# Patient Record
Sex: MALE | HISPANIC OR LATINO | Employment: OTHER | URBAN - METROPOLITAN AREA
[De-identification: names, ages, dates, MRNs, and addresses within clinical notes are randomized per-mention and may not be internally consistent; named-entity substitution may affect disease eponyms.]

---

## 2019-12-23 ENCOUNTER — COMMUNICATION - HEALTHEAST (OUTPATIENT)
Dept: NEUROSURGERY | Facility: CLINIC | Age: 74
End: 2019-12-23

## 2019-12-23 DIAGNOSIS — S22.009A THORACIC SPINE FRACTURE (H): ICD-10-CM

## 2020-01-02 ENCOUNTER — AMBULATORY - HEALTHEAST (OUTPATIENT)
Dept: CARDIOLOGY | Facility: CLINIC | Age: 75
End: 2020-01-02

## 2020-01-02 ENCOUNTER — RECORDS - HEALTHEAST (OUTPATIENT)
Dept: ADMINISTRATIVE | Facility: OTHER | Age: 75
End: 2020-01-02

## 2020-01-06 ENCOUNTER — OFFICE VISIT (OUTPATIENT)
Dept: FAMILY MEDICINE | Facility: CLINIC | Age: 75
End: 2020-01-06
Payer: MEDICARE

## 2020-01-06 VITALS
RESPIRATION RATE: 18 BRPM | BODY MASS INDEX: 28.12 KG/M2 | DIASTOLIC BLOOD PRESSURE: 75 MMHG | TEMPERATURE: 98.2 F | OXYGEN SATURATION: 99 % | HEART RATE: 73 BPM | SYSTOLIC BLOOD PRESSURE: 142 MMHG | WEIGHT: 175 LBS | HEIGHT: 66 IN

## 2020-01-06 DIAGNOSIS — Z78.9 TRANSITION OF CARE PERFORMED WITH SHARING OF CLINICAL SUMMARY: Primary | ICD-10-CM

## 2020-01-06 PROBLEM — I25.10 CORONARY ARTERY DISEASE INVOLVING NATIVE CORONARY ARTERY OF NATIVE HEART WITHOUT ANGINA PECTORIS: Status: ACTIVE | Noted: 2020-01-06

## 2020-01-06 PROBLEM — R55 SYNCOPE AND COLLAPSE: Status: ACTIVE | Noted: 2019-12-21

## 2020-01-06 RX ORDER — METFORMIN HCL 500 MG
2000 TABLET, EXTENDED RELEASE 24 HR ORAL
COMMUNITY

## 2020-01-06 RX ORDER — ALLOPURINOL 300 MG/1
300 TABLET ORAL DAILY
COMMUNITY

## 2020-01-06 RX ORDER — LANOLIN ALCOHOL/MO/W.PET/CERES
1000 CREAM (GRAM) TOPICAL DAILY
COMMUNITY

## 2020-01-06 RX ORDER — ATORVASTATIN CALCIUM 40 MG/1
40 TABLET, FILM COATED ORAL AT BEDTIME
COMMUNITY
Start: 2019-12-24 | End: 2020-01-29

## 2020-01-06 RX ORDER — CLOPIDOGREL BISULFATE 75 MG/1
75 TABLET ORAL DAILY
COMMUNITY

## 2020-01-06 RX ORDER — TRIAMTERENE AND HYDROCHLOROTHIAZIDE 37.5; 25 MG/1; MG/1
1 CAPSULE ORAL EVERY MORNING
COMMUNITY

## 2020-01-06 RX ORDER — TIMOLOL MALEATE 2.5 MG/ML
1 SOLUTION/ DROPS OPHTHALMIC DAILY
COMMUNITY

## 2020-01-06 RX ORDER — LISINOPRIL 5 MG/1
5 TABLET ORAL DAILY
COMMUNITY

## 2020-01-06 RX ORDER — LATANOPROST 50 UG/ML
1 SOLUTION/ DROPS OPHTHALMIC AT BEDTIME
COMMUNITY

## 2020-01-06 RX ORDER — SIMVASTATIN 20 MG
20 TABLET ORAL AT BEDTIME
COMMUNITY
End: 2020-02-04 | Stop reason: ALTCHOICE

## 2020-01-06 RX ORDER — GABAPENTIN 100 MG/1
100 CAPSULE ORAL 3 TIMES DAILY PRN
COMMUNITY
Start: 2015-05-30

## 2020-01-06 RX ORDER — PREGABALIN 300 MG/1
300 CAPSULE ORAL 2 TIMES DAILY
COMMUNITY

## 2020-01-06 RX ORDER — TIMOLOL MALEATE 5 MG/ML
1 SOLUTION/ DROPS OPHTHALMIC DAILY
COMMUNITY

## 2020-01-06 SDOH — HEALTH STABILITY: MENTAL HEALTH: HOW OFTEN DO YOU HAVE A DRINK CONTAINING ALCOHOL?: NEVER

## 2020-01-06 ASSESSMENT — MIFFLIN-ST. JEOR: SCORE: 1471.54

## 2020-01-06 NOTE — Clinical Note
Dr. Mckenzie- Please add your attestation, review and close ASAP. We are almost at the 30 day nyasia. - Shannon

## 2020-01-06 NOTE — PROGRESS NOTES
"       SUBJECTIVE       Scott Gottlieb Sr. is a 74 year old  male with a PMH significant for:     Patient Active Problem List   Diagnosis     Coronary artery disease involving native coronary artery of native heart without angina pectoris     CAD (coronary artery disease)     B12 deficiency     CRF (chronic renal failure)     HTN (hypertension)     Syncope and collapse     Type 2 diabetes mellitus (H)     He presents for follow-up after hospitalization from 12/21/2019 to 12/24/2018 following a syncopal episode in which patient fell and sustained a T3-T4 fracture.  Since hospitalization patient has been doing well though he continues to have some difficulty with sleep.  He attributes this to a sensation which he has difficulty articulating when he lies down.  He describes it in an animated fashion and says \"boom\" holding his hand next to his right occiput.  He denies pain or shooting sensation.  He initially endorsed dizziness but denied it with specific questioning.  He is unable to further localize or qualify the sensation.  He does endorse some pain on the back of his head where he has some edema and an abrasion from his fall. Pt also talks about not liking the food he used to like. He feels \"down\" since his fall because he does not know what caused his fall and whether it will happen again.     Pt has an orthopedic brace for his spinal fracture and is also wearing an event monitor. He has follow up appointments on Monday and Wednesday of next week respectively for these issues.     PMH, Medications and Allergies were reviewed and updated as needed.        REVIEW OF SYSTEMS   12 point review of systems negative except as noted in hpi        OBJECTIVE     Vitals:    01/06/20 1317 01/06/20 1323   BP: (!) 157/74 (!) 142/75   BP Location: Left arm Left arm   Patient Position: Sitting Sitting   Cuff Size: Adult Regular Adult Regular   Pulse: 73    Resp: 18    Temp: 98.2  F (36.8  C)    TempSrc: Oral    SpO2: 99%  " "  Weight: 79.4 kg (175 lb)    Height: 1.676 m (5' 6\")      Body mass index is 28.25 kg/m .    Constitutional: Awake, alert, cooperative, no apparent distress, and appears stated age.  Neck: Supple, symmetrical, trachea midline, no adenopathy, thyroid symmetric, not enlarged and no tenderness, skin normal.  Back: Symmetric, no curvature, spinous processes are non-tender on palpation, No t3-t4 tenderness as before  Lungs: No increased work of breathing, good air exchange, clear to auscultation bilaterally, no crackles or wheezing.  Cardiovascular: Regular rate and rhythm, normal S1 and S2, no S3 or S4, and no murmur noted.  Neurologic: Awake, alert, oriented to name, place and time.  Cranial nerves II-XII are grossly intact.  Motor is 5 out of 5 bilaterally. Sensory intact, and gait is normal.  Neuropsychiatric: Normal affect, mood, orientation, memory and insight.  Skin: No rashes, erythema, pallor, petechia or purpura.    No results found for this or any previous visit (from the past 24 hour(s)).        ASSESSMENT AND PLAN     There are no diagnoses linked to this encounter.  Scott was seen today for hospital f/u.    Diagnoses and all orders for this visit:    Transition of care performed with sharing of clinical summary    Pt has no new complaints today. However it was beneficial to establish care at our clinic. Pt has not had primary care for some time and it sounds like he thought of his ophthalmologist as his primary for a while now.     Pt encouraged to follow up after visits with cardiology and neurosurgery next week. Would like to probe a bit further into mental health/possible depression at that visit despite pt being somewhat resistant to mental health questions today.       RTC in 2 weeks for follow up after seeing specialists or sooner if develops new or worsening symptoms.    Eliezer Branch MD    "

## 2020-01-08 ASSESSMENT — PATIENT HEALTH QUESTIONNAIRE - PHQ9: SUM OF ALL RESPONSES TO PHQ QUESTIONS 1-9: 13

## 2020-01-13 ENCOUNTER — OFFICE VISIT - HEALTHEAST (OUTPATIENT)
Dept: CARDIOLOGY | Facility: CLINIC | Age: 75
End: 2020-01-13

## 2020-01-13 DIAGNOSIS — I25.10 CORONARY ARTERY DISEASE INVOLVING NATIVE CORONARY ARTERY OF NATIVE HEART WITHOUT ANGINA PECTORIS: ICD-10-CM

## 2020-01-13 DIAGNOSIS — R55 SYNCOPE AND COLLAPSE: ICD-10-CM

## 2020-01-13 ASSESSMENT — MIFFLIN-ST. JEOR: SCORE: 1543.84

## 2020-01-15 ENCOUNTER — HOSPITAL ENCOUNTER (OUTPATIENT)
Dept: RADIOLOGY | Facility: CLINIC | Age: 75
Discharge: HOME | End: 2020-01-15
Attending: NEUROLOGICAL SURGERY

## 2020-01-15 ENCOUNTER — OFFICE VISIT - HEALTHEAST (OUTPATIENT)
Dept: NEUROSURGERY | Facility: CLINIC | Age: 75
End: 2020-01-15

## 2020-01-15 DIAGNOSIS — S22.038D OTHER CLOSED FRACTURE OF THIRD THORACIC VERTEBRA WITH ROUTINE HEALING, SUBSEQUENT ENCOUNTER: ICD-10-CM

## 2020-01-15 DIAGNOSIS — S22.009A THORACIC SPINE FRACTURE (H): ICD-10-CM

## 2020-01-15 ASSESSMENT — MIFFLIN-ST. JEOR: SCORE: 1540.6

## 2020-01-29 ENCOUNTER — TELEPHONE (OUTPATIENT)
Dept: FAMILY MEDICINE | Facility: CLINIC | Age: 75
End: 2020-01-29

## 2020-01-29 DIAGNOSIS — I25.10 CORONARY ARTERY DISEASE INVOLVING NATIVE CORONARY ARTERY OF NATIVE HEART WITHOUT ANGINA PECTORIS: Primary | ICD-10-CM

## 2020-01-29 RX ORDER — ATORVASTATIN CALCIUM 40 MG/1
40 TABLET, FILM COATED ORAL AT BEDTIME
Qty: 30 TABLET | Refills: 3 | Status: SHIPPED | OUTPATIENT
Start: 2020-01-29 | End: 2020-05-15

## 2020-01-29 RX ORDER — ATORVASTATIN CALCIUM 40 MG/1
40 TABLET, FILM COATED ORAL AT BEDTIME
Qty: 30 TABLET | Refills: 3 | Status: SHIPPED | OUTPATIENT
Start: 2020-01-29 | End: 2020-01-29

## 2020-01-29 NOTE — TELEPHONE ENCOUNTER
Medication refill request sent to provider. Waiting for approval.  Russ Mitchell, [facility]

## 2020-01-29 NOTE — TELEPHONE ENCOUNTER
[facility] phone call message- patient requesting a refill:    Full Medication Name: atorvastatin    Dose: 40 MG    Pharmacy confirmed as   [facility]/pharmacy #[id number] - [place], [place] - [address] [place] [address]  Phone: [phone] Fax: [phone]  : Yes    Additional Comments: The pt is out and needs a refill      OK to leave a message on voice mail? Yes    Primary language: English      needed? No    Call taken on January 29, 2020 at 11:14 AM by Marcel Barksdale

## 2020-02-04 ENCOUNTER — OFFICE VISIT (OUTPATIENT)
Dept: FAMILY MEDICINE | Facility: CLINIC | Age: 75
End: 2020-02-04
Payer: MEDICARE

## 2020-02-04 VITALS
HEART RATE: 73 BPM | SYSTOLIC BLOOD PRESSURE: 139 MMHG | DIASTOLIC BLOOD PRESSURE: 79 MMHG | TEMPERATURE: 97.4 F | WEIGHT: 175.8 LBS | RESPIRATION RATE: 16 BRPM | BODY MASS INDEX: 28.37 KG/M2

## 2020-02-04 DIAGNOSIS — R55 SYNCOPE AND COLLAPSE: ICD-10-CM

## 2020-02-04 DIAGNOSIS — K21.9 GASTROESOPHAGEAL REFLUX DISEASE, ESOPHAGITIS PRESENCE NOT SPECIFIED: Primary | ICD-10-CM

## 2020-02-04 LAB
ALBUMIN SERPL BCP-MCNC: 4.2 G/DL (ref 3.5–5)
ALP SERPL-CCNC: 101 U/L (ref 45–120)
ALT SERPL W/O P-5'-P-CCNC: 13 U/L (ref 0–45)
ANION GAP SERPL CALCULATED.3IONS-SCNC: 13 MMOL/L (ref 5–18)
AST SERPL-CCNC: 14 U/L (ref 0–40)
BILIRUB SERPL-MCNC: 0.4 MG/DL (ref 0–1)
BUN SERPL-MCNC: 29 MG/DL (ref 8–28)
CALCIUM SERPL-MCNC: 10.4 MG/DL (ref 8.5–10.5)
CHLORIDE SERPL-SCNC: 105 MMOL/L (ref 98–107)
CO2 SERPL-SCNC: 21 MMOL/L (ref 22–31)
CREAT SERPL-MCNC: 1.56 MG/DL (ref 0.7–1.3)
GLUCOSE SERPL-MCNC: 95 MG/DL (ref 70–125)
POTASSIUM SERPL-SCNC: 5 MMOL/L (ref 3.5–5)
PROT SERPL-MCNC: 8.4 G/DL (ref 6–8)
SODIUM SERPL-SCNC: 139 MMOL/L (ref 136–145)

## 2020-02-04 RX ORDER — FAMOTIDINE 10 MG
10 TABLET ORAL 2 TIMES DAILY PRN
Qty: 30 TABLET | Refills: 0 | Status: SHIPPED | OUTPATIENT
Start: 2020-02-04

## 2020-02-04 RX ORDER — CHOLECALCIFEROL (VITAMIN D3) 50 MCG
1 TABLET ORAL DAILY
Qty: 90 TABLET | Refills: 3 | Status: SHIPPED | OUTPATIENT
Start: 2020-02-04 | End: 2020-02-04 | Stop reason: ALTCHOICE

## 2020-02-04 NOTE — PROGRESS NOTES
Preceptor Attestation:   Patient seen, evaluated and discussed with the resident. I have verified the content of the note, which accurately reflects my assessment of the patient and the plan of care.   Supervising Physician:  Blair Campos MD

## 2020-02-04 NOTE — Clinical Note
"Great work on the update.Use a phrase\" I precepted with Dr Blair Campos.\"Thank you,Blair Campos MD"

## 2020-02-04 NOTE — PATIENT INSTRUCTIONS
Patient Education     Understanding Vasovagal Syncope  Vasovagal syncope is fainting caused by a complex nerve and blood vessel reaction in the body. It s the most common cause of fainting. Unlike other causes of fainting, it s not a sign of a problem with the heart or brain.     How to say it  [id number]  [id number]   How vasovagal syncope happens  Many nerves connect with your heart and blood vessels. These nerves help control the speed and force of your heartbeat. They also regulate blood pressure. They control whether your blood vessels should be more open or more closed.  Usually these nerves work together so you always get enough blood to your brain. In certain cases, these nerves may give a wrong signal. This may cause your blood vessels to open wide. At the same time, your heartbeat slows down. Blood can start to pool in your legs, and not enough of it may reach the brain. If that happens, you may briefly lose consciousness. When you lie down or fall down, blood flow to the brain resumes.  What causes vasovagal syncope?  Many triggers can cause vasovagal syncope, such as:    Standing for long periods    Too much heat    Intense emotion, such as fear    Intense pain    The sight of blood or a needle    Exercising for a long time  Older adults may have additional triggers, such as:    Urinating    Swallowing    Coughing    Having a bowel movement  Symptoms of vasovagal syncope  Fainting is the main symptom of vasovagal syncope. You may have symptoms before fainting such as:    Nausea    Warm, flushed feeling    Face that turns pale    Sweaty palms    Feeling dizzy    Blurred vision  If you lie down at the first sign of these symptoms, you will often be able to prevent fainting. Not everyone notices symptoms before fainting, however.  When a person does faint, lying down restores blood flow to the brain. Consciousness should return fairly quickly. You might not feel normal for a little while after you  faint. You might feel depressed or fatigued for a short time. You may even feel nauseous afterwards and vomit.  Some people have only 1 or 2 episodes of vasovagal syncope in their life. For others, it happens more often and with no warning.  Diagnosing vasovagal syncope  Your healthcare provider will ask about your health history and your symptoms. He or she will give you a physical exam. Your blood pressure may be measured while lying down, seated, and standing. You may also have an electrocardiogram (ECG). This is a simple test that looks at the heart s rhythm.  You may be checked for other possible causes of fainting. You may have other tests such as:    Continuous portable ECG monitoring, to look at heart rhythms over time, such as with a holter or event monitor    Echocardiogram, to look at the blood flow in the heart and the heart s motion    Exercise stress testing, to see how your heart does during exercise    Blood tests to check for signs of disease  If these tests are normal, you may have a tilt table test. For this test, you lie down on a platform. Your heart rate and blood pressure are measured while you are lying down. The platform is then tilted upright. Your heart rate and blood pressure are measured again. If you have vasovagal syncope, you may faint during the upward tilt. Sometimes medicines that increase heart rate and the force of heart contractions are used to try and provoke a syncopal episode.  There are many causes of syncope. Some causes are not dangerous. In older persons, unexplained syncope can be a sign of a serious infection or a heart attack. Call 911 or seek immediate medical attention to be evaluated, especially if there has been a fall and injury with the syncope. You should not drive yourself to the hospital or emergency department after a syncopal episode for the safety of yourself or other drivers and passengers. Have someone drive you. Your provider may restrict your driving  until the cause of the syncope is better understood and to make sure the syncope does not become chronic or if it is unpredictable.  Date Last Reviewed: 3/1/2017    3436-2120 [employer]. [address] [place] [address], [place] [address]. All rights reserved. This information is not intended as a substitute for professional medical care. Always follow your healthcare professional's instructions.

## 2020-02-04 NOTE — PROGRESS NOTES
"       SUBJECTIVE       Scott Gottlieb Sr. is a 74 year old  male with a PMH significant for:     Patient Active Problem List   Diagnosis     Coronary artery disease involving native coronary artery of native heart without angina pectoris     CAD (coronary artery disease)     B12 deficiency     CRF (chronic renal failure)     HTN (hypertension)     Syncope and collapse     Type 2 diabetes mellitus (H)     He presents for follow up visit.     Pt was seen in clinic on 1/6/2020 after hospitalization at [facility] from 12/21/2019 to 12/24/2018 following a syncopal episode in which patient fell and sustained a T3-T4 fracture. Pt was discharged from that hospitalization with a back brace for his fracture and a 14 day event monitor.     Pt had follow up with Dr Baljinder Jacinto at [facility] [place] on 1/13/20. His event monitor was reviewed as well as data from an echocardiogram and Stress testing. Pt had no arrhythmias on the event monitor, echo showed 47% EF, and stress testing showed no inducible ischemia. His syncopal episode was thought to be due to vasovagal syncope with possible induction from a strong smell.     On 1/15/20 pt had follow up with neurosurgery team from [facility]. His X-ray was stable and he was instructed to continue to wear his back brace. He will have follow up in March for evaluation of his injury with a CT scan at which point a decision may be made to remove the brace (though back braces are typically left in place for 12 weeks.     Otherwise pt is feeling well today. He has continued to have brief episodes which he cannot articulate well but describes as feeling like he did before his syncopal episode. However he denies associated sensations of dizziness, room spinning, lightheadedness, and visual changes. He just says \"like whoosh\" when describing the sensations. He also has had issues with reflux since discharging from the hospital. He gets heart burn and a bad acidic taste " "in his mouth which resolves after drinking water.    Pt denies depression, but has been frustrated with his limitations in his back brace. He has stopped his gambling hobby even though his family offers to drive him to the casino, however he denies a lack of interest in things he typically likes. His sleep is poor but this has been an issue for 40 years. He does not have any feelings of guilt. His energy levels are normal and he is \"always hungry\". Pt has not been moving more slowly and has no suicidal ideation.     PMH, Medications and Allergies were reviewed and updated as needed.        REVIEW OF SYSTEMS     14 point ROS is negative except as mentioned in the HPI        OBJECTIVE     There were no vitals filed for this visit.  There is no height or weight on file to calculate BMI.    Constitutional: Awake, alert, cooperative, no apparent distress, and appears stated age.  Eyes: Lids and lashes normal, extra ocular muscles intact, sclera clear, conjunctiva normal.  Lungs: No increased work of breathing, good air exchange, clear to auscultation bilaterally  Cardiovascular: Regular rate and rhythm, normal S1 and S2, no S3 or S4, and no murmur noted.  Musculoskeletal: back brace in place   Neurologic: Awake, alert, oriented to name, place and time.  Cranial nerves II-XII are grossly intact. Cerebellar finger to nose,Sensory is intact. and gait is normal.  Neuropsychiatric: Normal affect, mood, orientation  Skin: No rashes, erythema, pallor, petechia or purpura.    No results found for this or any previous visit (from the past 24 hour(s)).        ASSESSMENT AND PLAN     Scott was seen today for recheck. It is difficult to elicit a linear history and it is not exactly clear what symptoms the pt is experiencing, however I met the pt in the setting of syncope with collapse and a T3-T4 fracture. He continues to complain of what sounds like presyncope however without a clear description of what the pt is experiencing it " is hard to say with any certainty.  Diagnoses and all orders for this visit:    Syncope and collapse   Pt had cardiogenic syncope ruled out previously. It is unlikely related to orthostatic hypotension given the lack of association with a change in position. I agree that it is likely vasovagal which may be triggered by noxious stimuli, stress, or medications. I will have the pt follow up in 2 weeks and bring his medications to do a better review of what he is taking.   -     Cardiac causation ruled out with 14 day event monitor  - Orthostatics normal today  -  Likely Vasovagal, educational nae provided   -     Comprehensive Metabolic ([facility]) - Results > 1 hr  - Medication review at next follow up    T3-T4 fracture   Pt has follow up with neurosurgery regarding his back brace and T3-T4 fracture. The fracture was not visible on most recent Xray however protocol is typically to wear for 12 weeks. Pt has his next follow up appointment in March at which point he may or may not come out of his brace after evaluation with a CT. Pt requesting card for handicap parking today. I think this is reasonable and will fill out for 13 months time. Will continue    - continue to wear back brace per ortho   - Paperwork for handicap parking card filled out     Application for handicap parking permit   - Given the back injury sustained by the pt I believe he has mobility limitations   - Application completed and signed    Gastroesophageal reflux disease, esophagitis presence not specified  Pt complaining of clear symptoms of burping and burning chest pain. He says this is new after being treated for hiccups with Reglan on discharge from the hospital. Given the symptoms are new and the pts age I would elect to go with an H2 blocker rather than PPI.  -     famotidine (PEPCID) 10 MG tablet; Take 1 tablet (10 mg) by mouth 2 times daily as needed  -     Comprehensive Metabolic ([facility]) - Results > 1 hr      RTC in 2 weeks for  review of medications (bring medications) or sooner if develops new or worsening symptoms.    Eliezer Branch MD      Preceptor Attestation:    Patient seen, evaluated and discussed with the resident. I have verified the content of the note, which accurately reflects my assessment of the patient and the plan of care.   Supervising Physician:  Blair Campos MD

## 2020-02-04 NOTE — Clinical Note
Preceptor attestation required. Assessment and plan Summary statement required and More detail related to  Medical decision making for each heading. Reordring of diagnosis with most important DX first Syncope or T3 fracture first.I can assist with some organizational tricks at the next clinic. Please revise note and send back to me. This is probably a higher level than L3 would bill L4. Also include the handicapped form completed.Best wishes,Blair Campos MD

## 2020-02-05 NOTE — PROGRESS NOTES
Preceptor Attestation:   Patient seen, evaluated and discussed with the resident. I have verified the content of the note, which accurately reflects my assessment of the patient and the plan of care.   Supervising Physician:  Joey Mckenzie MD.

## 2020-02-17 ENCOUNTER — HOSPITAL ENCOUNTER (OUTPATIENT)
Dept: CT IMAGING | Facility: CLINIC | Age: 75
Discharge: HOME | End: 2020-02-17

## 2020-02-17 DIAGNOSIS — S22.038D OTHER CLOSED FRACTURE OF THIRD THORACIC VERTEBRA WITH ROUTINE HEALING, SUBSEQUENT ENCOUNTER: ICD-10-CM

## 2020-02-20 ENCOUNTER — OFFICE VISIT - HEALTHEAST (OUTPATIENT)
Dept: NEUROSURGERY | Facility: CLINIC | Age: 75
End: 2020-02-20

## 2020-02-20 DIAGNOSIS — S22.039D CLOSED FRACTURE OF THIRD THORACIC VERTEBRA WITH ROUTINE HEALING, UNSPECIFIED FRACTURE MORPHOLOGY, SUBSEQUENT ENCOUNTER: ICD-10-CM

## 2020-03-03 ENCOUNTER — DOCUMENTATION ONLY (OUTPATIENT)
Dept: FAMILY MEDICINE | Facility: CLINIC | Age: 75
End: 2020-03-03
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
## 2020-03-04 NOTE — PROGRESS NOTES
I have reviewed and agree with the behavioral health fellow's summary and recommendations.  Nirmala Anderson, PhD., LP

## 2020-03-05 NOTE — PROGRESS NOTES
This SW reached out to [facility] to discuss scheduling a f/u visit with  or  towards the end of March. Called his primary listed #, no answer. ALONSO did leave a brief message and asked him to call the clinic to schedule an appointment towards the end of March.     Will reach out again in 1 week.     ANAID Paul

## 2020-03-16 ENCOUNTER — HOSPITAL ENCOUNTER (OUTPATIENT)
Dept: RADIOLOGY | Facility: CLINIC | Age: 75
Discharge: HOME | End: 2020-03-16

## 2020-03-16 ENCOUNTER — HOSPITAL ENCOUNTER (OUTPATIENT)
Dept: CT IMAGING | Facility: CLINIC | Age: 75
Discharge: HOME | End: 2020-03-16

## 2020-03-16 DIAGNOSIS — S22.039D CLOSED FRACTURE OF THIRD THORACIC VERTEBRA WITH ROUTINE HEALING, UNSPECIFIED FRACTURE MORPHOLOGY, SUBSEQUENT ENCOUNTER: ICD-10-CM

## 2020-03-19 ENCOUNTER — OFFICE VISIT - HEALTHEAST (OUTPATIENT)
Dept: NEUROSURGERY | Facility: CLINIC | Age: 75
End: 2020-03-19

## 2020-03-19 DIAGNOSIS — S22.038G OTHER CLOSED FRACTURE OF THIRD THORACIC VERTEBRA WITH DELAYED HEALING, SUBSEQUENT ENCOUNTER: ICD-10-CM

## 2020-03-19 DIAGNOSIS — S22.039D CLOSED FRACTURE OF THIRD THORACIC VERTEBRA WITH ROUTINE HEALING, UNSPECIFIED FRACTURE MORPHOLOGY, SUBSEQUENT ENCOUNTER: ICD-10-CM

## 2020-03-27 ENCOUNTER — TELEPHONE (OUTPATIENT)
Dept: FAMILY MEDICINE | Facility: CLINIC | Age: 75
End: 2020-03-27

## 2020-03-27 NOTE — LETTER
April 1, 2020      Scott Gottlieb Sr.  [address] [place] [address]      Dear Scott,      We are sending this letter as a reminder that you are due for a clinic follow up visit. Please call [facility] to schedule your appointment.     [phone]      Sincerely,      [facility] Team

## 2020-03-27 NOTE — TELEPHONE ENCOUNTER
SW reached out to patient, as a follow up from [facility] in early March 2020, to schedule PCP f/u. Unable to connect with patient. Did leave a brief VM and requested patient to call clinic main # and schedule a f/u PCP visit with either  or .     Routing to  to determine if this f/u visit wants in person or telehealth visit.     ALONSO will f/u with Scott in 2-3 days. If unable to connect, will mail a letter at that time.     ANAID Paul

## 2020-03-29 ENCOUNTER — COMMUNICATION - HEALTHEAST (OUTPATIENT)
Dept: NEUROLOGY | Facility: CLINIC | Age: 75
End: 2020-03-29

## 2020-03-31 ENCOUNTER — COMMUNICATION - HEALTHEAST (OUTPATIENT)
Dept: NEUROSURGERY | Facility: CLINIC | Age: 75
End: 2020-03-31

## 2020-04-01 NOTE — TELEPHONE ENCOUNTER
SW attempted 2nd outreach at patient's primary listed phone #. He did not answer. Unable to leave a VM.    SW drafted and mailed letter to patient, on this date, asking him to call the clinic and schedule a f/u visit.     Letter sent to [employer] office for mailing since they are working out of the clinic at this time.     ANAID Paul

## 2020-04-03 ENCOUNTER — VIRTUAL VISIT (OUTPATIENT)
Dept: FAMILY MEDICINE | Facility: CLINIC | Age: 75
End: 2020-04-03
Payer: MEDICARE

## 2020-04-03 DIAGNOSIS — S32.009S CLOSED FRACTURE OF LUMBAR VERTEBRA, UNSPECIFIED FRACTURE MORPHOLOGY, UNSPECIFIED LUMBAR VERTEBRAL LEVEL, SEQUELA: Primary | ICD-10-CM
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
## 2020-04-16 ENCOUNTER — HOSPITAL ENCOUNTER (OUTPATIENT)
Dept: RADIOLOGY | Facility: CLINIC | Age: 75
Discharge: HOME | End: 2020-04-16

## 2020-04-16 ENCOUNTER — HOSPITAL ENCOUNTER (OUTPATIENT)
Dept: CT IMAGING | Facility: CLINIC | Age: 75
Discharge: HOME | End: 2020-04-16

## 2020-04-16 DIAGNOSIS — S22.038G OTHER CLOSED FRACTURE OF THIRD THORACIC VERTEBRA WITH DELAYED HEALING, SUBSEQUENT ENCOUNTER: ICD-10-CM

## 2020-04-17 ENCOUNTER — COMMUNICATION - HEALTHEAST (OUTPATIENT)
Dept: NEUROSURGERY | Facility: CLINIC | Age: 75
End: 2020-04-17

## 2020-04-17 ENCOUNTER — OFFICE VISIT - HEALTHEAST (OUTPATIENT)
Dept: NEUROSURGERY | Facility: CLINIC | Age: 75
End: 2020-04-17

## 2020-04-17 DIAGNOSIS — S22.039D CLOSED FRACTURE OF THIRD THORACIC VERTEBRA WITH ROUTINE HEALING, UNSPECIFIED FRACTURE MORPHOLOGY, SUBSEQUENT ENCOUNTER: ICD-10-CM

## 2020-05-15 DIAGNOSIS — I25.10 CORONARY ARTERY DISEASE INVOLVING NATIVE CORONARY ARTERY OF NATIVE HEART WITHOUT ANGINA PECTORIS: ICD-10-CM

## 2020-05-15 RX ORDER — ATORVASTATIN CALCIUM 40 MG/1
TABLET, FILM COATED ORAL
Qty: 30 TABLET | Refills: 3 | Status: SHIPPED | OUTPATIENT
Start: 2020-05-15 | End: 2020-08-17

## 2020-12-21 DIAGNOSIS — I25.10 CORONARY ARTERY DISEASE INVOLVING NATIVE CORONARY ARTERY OF NATIVE HEART WITHOUT ANGINA PECTORIS: ICD-10-CM

## 2020-12-21 RX ORDER — ATORVASTATIN CALCIUM 40 MG/1
TABLET, FILM COATED ORAL
Qty: 90 TABLET | Refills: 1 | Status: SHIPPED | OUTPATIENT
Start: 2020-12-21 | End: 2021-06-23

## 2020-12-21 NOTE — TELEPHONE ENCOUNTER
Reviewed chart. Is due for updated lipid lab, but will refill due to limitations with coming into clinic due to COVID.

## 2020-12-28 ENCOUNTER — COMMUNICATION - HEALTHEAST (OUTPATIENT)
Dept: ADMINISTRATIVE | Facility: CLINIC | Age: 75
End: 2020-12-28

## 2021-02-04 ENCOUNTER — RECORDS - HEALTHEAST (OUTPATIENT)
Dept: ADMINISTRATIVE | Facility: OTHER | Age: 76
End: 2021-02-04

## 2021-02-11 ENCOUNTER — COMMUNICATION - HEALTHEAST (OUTPATIENT)
Dept: CARDIOLOGY | Facility: CLINIC | Age: 76
End: 2021-02-11

## 2021-02-11 ENCOUNTER — AMBULATORY - HEALTHEAST (OUTPATIENT)
Dept: NURSING | Facility: CLINIC | Age: 76
End: 2021-02-11

## 2021-02-12 ENCOUNTER — OFFICE VISIT - HEALTHEAST (OUTPATIENT)
Dept: CARDIOLOGY | Facility: CLINIC | Age: 76
End: 2021-02-12

## 2021-02-12 DIAGNOSIS — I25.5 ISCHEMIC CARDIOMYOPATHY: ICD-10-CM

## 2021-02-12 DIAGNOSIS — I25.10 CORONARY ARTERY DISEASE INVOLVING NATIVE CORONARY ARTERY OF NATIVE HEART WITHOUT ANGINA PECTORIS: ICD-10-CM

## 2021-02-12 ASSESSMENT — MIFFLIN-ST. JEOR: SCORE: 1540.6

## 2021-02-19 ENCOUNTER — OFFICE VISIT (OUTPATIENT)
Dept: FAMILY MEDICINE | Facility: CLINIC | Age: 76
End: 2021-02-19
Payer: MEDICARE

## 2021-02-19 VITALS
DIASTOLIC BLOOD PRESSURE: 75 MMHG | RESPIRATION RATE: 12 BRPM | WEIGHT: 179.4 LBS | BODY MASS INDEX: 28.96 KG/M2 | HEART RATE: 64 BPM | TEMPERATURE: 97.6 F | SYSTOLIC BLOOD PRESSURE: 147 MMHG | OXYGEN SATURATION: 100 %

## 2021-02-19 DIAGNOSIS — N18.30 STAGE 3 CHRONIC KIDNEY DISEASE, UNSPECIFIED WHETHER STAGE 3A OR 3B CKD (H): ICD-10-CM

## 2021-02-19 DIAGNOSIS — Z00.00 ENCOUNTER FOR MEDICARE ANNUAL WELLNESS EXAM: Primary | ICD-10-CM

## 2021-02-19 LAB
BUN SERPL-MCNC: 34.6 MG/DL (ref 7–21)
CALCIUM SERPL-MCNC: 9.3 MG/DL (ref 8.5–10.1)
CHLORIDE SERPLBLD-SCNC: 103 MMOL/L (ref 98–110)
CHOLEST SERPL-MCNC: 144.7 MG/DL (ref 0–200)
CHOLEST/HDLC SERPL: 4.8 {RATIO} (ref 0–5)
CO2 SERPL-SCNC: 23.6 MMOL/L (ref 20–32)
CREAT SERPL-MCNC: 1.5 MG/DL (ref 0.7–1.3)
GFR SERPL CREATININE-BSD FRML MDRD: 47.2 ML/MIN/1.7 M2
GLUCOSE SERPL-MCNC: 95.3 MG'DL (ref 70–99)
HBA1C MFR BLD: 6.8 % (ref 4.1–5.7)
HDLC SERPL-MCNC: 30.1 MG/DL
LDLC SERPL CALC-MCNC: 66 MG/DL (ref 0–129)
POTASSIUM SERPL-SCNC: 4.5 MMOL/L (ref 3.2–4.6)
SODIUM SERPL-SCNC: 136.2 MMOL/L (ref 132–142)
TRIGL SERPL-MCNC: 242.7 MG/DL (ref 0–150)
VLDL CHOLESTEROL: 48.5 MG/DL (ref 7–32)

## 2021-02-19 PROCEDURE — 36415 COLL VENOUS BLD VENIPUNCTURE: CPT | Performed by: STUDENT IN AN ORGANIZED HEALTH CARE EDUCATION/TRAINING PROGRAM

## 2021-02-19 PROCEDURE — 80061 LIPID PANEL: CPT | Performed by: STUDENT IN AN ORGANIZED HEALTH CARE EDUCATION/TRAINING PROGRAM

## 2021-02-19 PROCEDURE — G0438 PPPS, INITIAL VISIT: HCPCS | Mod: GC | Performed by: STUDENT IN AN ORGANIZED HEALTH CARE EDUCATION/TRAINING PROGRAM

## 2021-02-19 PROCEDURE — 83036 HEMOGLOBIN GLYCOSYLATED A1C: CPT | Performed by: STUDENT IN AN ORGANIZED HEALTH CARE EDUCATION/TRAINING PROGRAM

## 2021-02-19 PROCEDURE — 80048 BASIC METABOLIC PNL TOTAL CA: CPT | Performed by: STUDENT IN AN ORGANIZED HEALTH CARE EDUCATION/TRAINING PROGRAM

## 2021-02-19 NOTE — PATIENT INSTRUCTIONS
Patient Education   Personalized Prevention Plan  You are due for the preventive services outlined below.  Your care team is available to assist you in scheduling these services.  If you have already completed any of these items, please share that information with your care team to update in your medical record.  Health Maintenance Due   Topic Date Due     A1C Lab  1945     Cholesterol Lab  1945     Kidney Microalbumin Urine Test  1945     Diabetic Foot Exam  1945     Discuss Advance Care Planning  1945     Eye Exam  1945     Pneumococcal Vaccine (1 of 4 - PCV13) 03/20/1950     Pneumococcal Vaccine (1 of 4 - PCV13) 03/20/1950     Colorectal Cancer Screening  03/20/1954     Hepatitis C Screening  03/20/1962     Zoster (Shingles) Vaccine (1 of 2) 03/20/1994     FALL RISK ASSESSMENT  03/20/2009     Depression Assessment  07/06/2020     Flu Vaccine (1) 09/01/2020     Basic Metabolic Panel  02/04/2021       MEDICARE PERSONAL PREVENTIVE SERVICES PLAN - IMMUNIZATIONS     Here are your recommended immunizations.  Take this home for your reference.                                                    IMMUNIZATIONS Description Recommend today?     Influenza (Flu shot) Prevents flu; should get every year Yes; Recommended and will wait to get shot done receive get covid-19 shot   PCV 13 Pneumonia vaccination; you get it once No: is not indicated today.   PPSV 23 Second pneumonia vaccination; usually get it 1 year after PCV 13 Recommeded and will wait for provider to determine shot   Zoster (Shingles) Prevents shingles; you get it once  (Check with Part D insurance for coverage, must receive at a pharmacy, not clinic) Recommeded and pt is informed where to get shot done at   Tetanus Prevents tetanus; once every 10 years No: is not indicated today.

## 2021-02-19 NOTE — PROGRESS NOTES
Preceptor Attestation:   Patient seen, evaluated and discussed with the resident. I have verified the content of the note, which accurately reflects my assessment of the patient and the plan of care.   Supervising Physician:  Elenita No MD

## 2021-02-19 NOTE — NURSING NOTE
Medicare Wellness Visit  Health Risk Assessment        Visual Acuity:  Patient have an upcoming vision appointment with his eye doctor in March 2021        FALL RISK ASSESSMENT 2/19/2021   Fallen 2 or more times in the past year? No   Any fall with injury in the past year? No            Health Risk Assessment / Review of Systems     Constitutional: Any fevers or night sweats? No     Eyes:  Vision problems   No     Hearing Do you feel you have hearing loss?   YES     Cardiovascular: Any chest pain, fast or irregular heart beat, calf pain with walking?     No           Respiratory:   Any breathing problems or cough?   No     Gastrointestinal: Any stomach or stool problems?    YES      Genitourinary: Do you have difficulty controlling urination?   No     Muscles and Joints: Any joint stiffness or soreness?    YES     Skin: Any concerning lesions or moles?   No     Nervous System: Any loss of strength or feeling, numbness or tingling, shaking, dizziness, or headache?  No     Mental Health: Any depression, anxiety or problems sleeping?     YES dizziness and headache    Cognition: Do you have any problems with your memory?  No     PHQ-2 Score:   PHQ-2 ( 1999 Pfizer) 2/19/2021 4/3/2020   Q1: Little interest or pleasure in doing things 1 0   Q2: Feeling down, depressed or hopeless 1 1   PHQ-2 Score 2 1       PHQ-9 Score:   [facility] Follow-up to PHQ 1/8/2020   PHQ-9 9. Suicide Ideation past 2 weeks Not at all            Medical Care     What other specialists or organizations are involved in your medical care?    Patient Care Team       Relationship Specialty Notifications Start End    Eliezer Branch MD PCP - General   12/27/19     Phone: [phone] Fax: [phone]         [facility], [facility], [address] [place], [place] [id number]    Eliezer Branch MD Assigned PCP   5/21/20     Phone: [phone] Fax: [phone]         [facility]  "[facility], [facility], [address] [facility] [address]                 Social History / Home Safety     Social History     Tobacco Use     Smoking status: Never Smoker     Smokeless tobacco: Never Used   Substance Use Topics     Alcohol use: Never     Frequency: Never     Marital Status:  Who lives in your household? wife    Does your home have any of the following safety concerns? Loose rugs in the hallway, no grab bars in the bathroom, no handrails on the stairs or have poorly lit areas?  No     Do you feel threatened or controlled by a partner, ex-partner or anyone in your life? No     Has anyone hurt you physically, for example by pushing, hitting, slapping or kicking you   or forcing you to have sex? No          Functional Status     Do you need help with dressing yourself, bathing, or walking?No     Do you need help with the phone, transportation, shopping, preparing meals, housework, laundry, medications or managing money?No       Risk Behaviors and Healthy Habits     History   Smoking Status     Never Smoker   Smokeless Tobacco     Never Used     How many servings of fruits and vegetables do you eat a day?     Exercise:       Do you frequently drive without a seatbelt? No     Do you use any other drugs? No         Do you use alcohol?No      Frailty Assessment            1. By yourself and note using aids, do you have difficulty walking up 10 steps without resting?   YES  (1 for Yes, 0 for No)    2. By yourself and not using mobility aids, do you have any difficulty walking several hundred yards?  YES  (1 for Yes, 0 for No)    3. Have you lost 10 or more pounds unintentionally in the previous year? No  (If \"Yes\" and >5% weight loss, then score 1.  Score 0, if <5% weight loss or \"No\" weight loss)    4. How much of the times during the past 3 weeks did you feel tired?  (\"1\" or \"2\" are scored 1, others 0)    5.  A doctor told the patient " they had the following illnesses:  High blood pressure  Chest pain due to heart issue  Diabetes  Heart attack  Arthritis (0-4 = score 0, 5-11= score 1)        Danisha Boykin CMA

## 2021-02-19 NOTE — PROGRESS NOTES
"  SUBJECTIVE:   Scott Gottlieb Sr. is a 75 year old male who presents for Preventive Visit.    Split Bill scripting  The purpose of this visit is to discuss your medical history and prevent health problems before you are sick. You may be responsible for a co-pay, coinsurance, or deductible if your visit today includes services such as checking on a sore throat, having an x-ray or lab test, or treating and evaluating a new or existing condition :[id number]}  Patient has been advised of split billing requirements and indicates understanding: Yes  Are you in the first 12 months of your Medicare Part B coverage?  No    Physical Health:    In general, how would you rate your overall physical health? good    Outside of work, how many days during the week do you exercise? none    Outside of work, approximately how many minutes a day do you exercise?not applicable    If you drink alcohol do you typically have >3 drinks per day or >7 drinks per week? No    Do you usually eat at least 4 servings of fruit and vegetables a day, include whole grains & fiber and avoid regularly eating high fat or \"junk\" foods? NO    Do you have any problems taking medications regularly?  No    Do you have any side effects from medications? none    Needs assistance for the following daily activities: no assistance needed    Which of the following safety concerns are present in your home?  none identified     Hearing impairment: Yes, Difficulty following a conversation in a noisy restaurant or crowded room.    Difficulty following dialogue in the theater.    Difficulty understanding speech on the telephone.    In the past 6 months, have you been bothered by leaking of urine? no    Mental Health:    In general, how would you rate your overall mental or emotional health? fair  PHQ-2 Score: 2    Do you feel safe in your environment? Yes    Have you ever done Advance Care Planning? (For example, a Health Directive, POLST, or a discussion with a medical " provider or your loved ones about your wishes): No, advance care planning information given to patient to review.  Patient plans to discuss their wishes with loved ones or provider.      Additional concerns to address?  No    Fall risk:  Fallen 2 or more times in the past year?: No  Any fall with injury in the past year?: No  click delete button to remove this line now  Cognitive Screenin) Repeat 3 items (Leader, Season, Table)    2) Clock draw: NORMAL  3) 3 item recall: Recalls 3 objects  Results: 3 items recalled: COGNITIVE IMPAIRMENT LESS LIKELY    Mini-CogTM Copyright S Brody. Licensed by the author for use in [facility]; reprinted with permission (keyonna@[facility]). All rights reserved.      Do you have sleep apnea, excessive snoring or daytime drowsiness?: no    Reviewed and updated as needed this visit by clinical staff  Tobacco  Allergies    Med Hx  Surg Hx  Fam Hx  Soc Hx        Reviewed and updated as needed this visit by Provider                Social History     Tobacco Use     Smoking status: Never Smoker     Smokeless tobacco: Never Used   Substance Use Topics     Alcohol use: Never     Frequency: Never                           Current providers sharing in care for this patient include:   Patient Care Team:  Eliezer Branch MD as PCP - General  Eliezer Branch MD as Assigned PCP    The following health maintenance items are reviewed in Epic and correct as of today:  Health Maintenance   Topic Date Due     A1C  1945     LIPID  1945     MICROALBUMIN  1944     DIABETIC FOOT EXAM  1944     ADVANCE CARE PLANNING  1944     EYE EXAM  1945     Pneumococcal Vaccine: Pediatrics (0 to 5 Years) and At-Risk Patients (6 to 64 Years) (1 of 4 - PCV13) 1950     Pneumococcal Vaccine: 65+ Years (1 of 4 - PCV13) 1950     COLORECTAL CANCER SCREENING  1954     HEPATITIS C SCREENING  1962     ZOSTER IMMUNIZATION (1 of 2) 1994     FALL  "RISK ASSESSMENT  03/20/2009     PHQ-9  07/06/2020     INFLUENZA VACCINE (1) 09/01/2020     BMP  02/04/2021     COVID-19 Vaccine (2 of 2 - Pfizer series) 03/04/2021     MEDICARE ANNUAL WELLNESS VISIT  02/19/2022     DTAP/TDAP/TD IMMUNIZATION (2 - Td) 12/21/2029     IPV IMMUNIZATION  Aged Out     MENINGITIS IMMUNIZATION  Aged Out     Lab work is in process  No vaccines indicated today due to covid vaccine dosed 1 week ago    ROS:  Constitutional, HEENT, cardiovascular, pulmonary, gi and gu systems are negative, except as otherwise noted.    OBJECTIVE:   BP (!) 147/75 (BP Location: Left arm, Patient Position: Sitting, Cuff Size: Adult Regular)   Pulse 64   Temp 97.6  F (36.4  C) (Oral)   Resp 12   Wt 81.4 kg (179 lb 6.4 oz)   SpO2 100%   BMI 28.96 kg/m   Estimated body mass index is 28.96 kg/m  as calculated from the following:    Height as of 1/6/20: 1.676 m (5' 6\").    Weight as of this encounter: 81.4 kg (179 lb 6.4 oz).  EXAM:   GENERAL: healthy, alert and no distress  NECK: no adenopathy, no asymmetry, masses, or scars and thyroid normal to palpation  RESP: lungs clear to auscultation - no rales, rhonchi or wheezes  CV: regular rate and rhythm, normal S1 S2, no S3 or S4, no murmur, click or rub, no peripheral edema and peripheral pulses strong  ABDOMEN: soft, nontender, no hepatosplenomegaly, no masses and bowel sounds normal  MS: no gross musculoskeletal defects noted, no edema    No results found for this or any previous visit (from the past 24 hour(s)).    ASSESSMENT / PLAN:       ICD-10-CM    1. Encounter for Medicare annual wellness exam  Z00.00 Lipid Panel ([facility])     Hemoglobin A1c ([facility])     Basic Metabolic Panel ([facility])     Microalbumin Random Ur ([facility])     Hepatitis C Antibody ([facility])       Patient has been advised of split billing requirements and indicates understanding: Yes    COUNSELING:  Reviewed preventive health counseling, as reflected in patient instructions       " "Regular exercise       Healthy diet/nutrition       Vision screening       Hearing screening    Estimated body mass index is 28.96 kg/m  as calculated from the following:    Height as of 1/6/20: 1.676 m (5' 6\").    Weight as of this encounter: 81.4 kg (179 lb 6.4 oz).    Weight management plan: Discussed healthy diet and exercise guidelines    He reports that he has never smoked. He has never used smokeless tobacco.    Appropriate preventive services were discussed with this patient, including applicable screening as appropriate for cardiovascular disease, diabetes, osteopenia/osteoporosis, and glaucoma.  As appropriate for age/gender, discussed screening for colorectal cancer, prostate cancer, breast cancer, and cervical cancer. Checklist reviewing preventive services available has been given to the patient.    Reviewed patients plan of care and provided an AVS. The Basic Care Plan (routine screening as documented in Health Maintenance) for Scott meets the Care Plan requirement. This Care Plan has been established and reviewed with the Patient.    Mental Health  I do have some concern for depression in this patient however he is resistant to PHQ-9 screening and discussion of therapeutic intervention. I attempted some motivational interviewing without success. Pt identified sleep as a concern and may be more amenable to discussion around depression through the problem of sleep/insomnia.       Pt requested to follow up in 2 weeks for BP check, advance directive discussion, and further discussion around mental health.  Shots delayed as he can get covid vaccine soon and want to avoid other shots within 2 weeks of that.    Precepted with Dr Elenita Branch MD  [facility]  "

## 2021-02-22 LAB — HCV AB SER QL: NEGATIVE

## 2021-02-24 PROBLEM — N18.30 CHRONIC KIDNEY DISEASE, STAGE 3 (H): Status: ACTIVE | Noted: 2021-02-24

## 2021-03-04 ENCOUNTER — AMBULATORY - HEALTHEAST (OUTPATIENT)
Dept: NURSING | Facility: CLINIC | Age: 76
End: 2021-03-04

## 2021-03-12 ENCOUNTER — OFFICE VISIT (OUTPATIENT)
Dept: FAMILY MEDICINE | Facility: CLINIC | Age: 76
End: 2021-03-12
Payer: MEDICARE

## 2021-03-12 VITALS
TEMPERATURE: 98 F | SYSTOLIC BLOOD PRESSURE: 128 MMHG | BODY MASS INDEX: 28.41 KG/M2 | RESPIRATION RATE: 18 BRPM | OXYGEN SATURATION: 99 % | WEIGHT: 176 LBS | DIASTOLIC BLOOD PRESSURE: 67 MMHG | HEART RATE: 76 BPM

## 2021-03-12 DIAGNOSIS — M54.50 CHRONIC LOW BACK PAIN WITHOUT SCIATICA, UNSPECIFIED BACK PAIN LATERALITY: ICD-10-CM

## 2021-03-12 DIAGNOSIS — I10 ESSENTIAL HYPERTENSION: Primary | ICD-10-CM

## 2021-03-12 DIAGNOSIS — G89.29 CHRONIC LOW BACK PAIN WITHOUT SCIATICA, UNSPECIFIED BACK PAIN LATERALITY: ICD-10-CM

## 2021-03-12 PROCEDURE — 99213 OFFICE O/P EST LOW 20 MIN: CPT | Mod: GC | Performed by: STUDENT IN AN ORGANIZED HEALTH CARE EDUCATION/TRAINING PROGRAM

## 2021-03-12 NOTE — PROGRESS NOTES
Preceptor Attestation:    I discussed the patient with the resident and evaluated the patient in person. I have verified the content of the note, which accurately reflects my assessment of the patient and the plan of care.   Supervising Physician:  Kodak Ordaz MD.

## 2021-03-12 NOTE — PROGRESS NOTES
Assessment & Plan     Essential hypertension  Patient here for hypertension follow-up.  He does not take his blood pressure at home however today's visit he is well controlled.  Patient takes lisinopril, and triamterene HCTZ.  We will continue with current regimen.    Chronic low back pain without sciatica, unspecified back pain laterality  Patient's low back pain is a product of an old injury to his back and has been chronic for the past year.  He has no sciatica or progression.  Patient recommended physical therapy but he declined.  He wants to follow-up with the chiropractor.  If this does not work he will try physical therapy.    Sleep difficulty  Patient struggling to fall asleep.  I do have some suspicion for depression however patient is resistant to talk about this.  I would like to try an SSRI to help patient but he is resistant to any new medications.  We will continue discussion with patient at his next visit.     No follow-ups on file.    Eliezer Branch MD  [facility] NEYMAR Chavez is a 75 year old who presents for the following health issues  accompanied by his spouse:    HPI   Pt here for blood pressure follow up. He does not check his blood pressure at home. He has had no episodes of fainting or feeling like he is going to faint nor episodes of vision changes or headache. He takes his medications without issue.    Pt would also like to talk about back pain, left sided, hip sometimes. It is a chronic pain that is residual from a vertebral fracture 1 year ago. He has not tried physical therapy and does not take anything to treat the pain. He is on both lyrica and gabapentin for neuropathy. He has seen a chiropractor previously and asks if he should see a chiropractor again.     Pt also has issues with sleep. He struggles to fall asleep and sometimes sleeps on his chair due to this. He has nights where he wakes up after a short period of time and then cannot  fall back asleep. He denies rumination or worrying at night. He tried melatonin for sleep sometimes and recently purchased a new memory foam mattress for his sleep.       Review of Systems   Constitutional, HEENT, cardiovascular, pulmonary, gi and gu systems are negative, except as otherwise noted.        Objective    /67 (BP Location: Left arm, Patient Position: Sitting, Cuff Size: Adult Regular)   Pulse 76   Temp 98  F (36.7  C) (Oral)   Resp 18   Wt 79.8 kg (176 lb)   SpO2 99%   BMI 28.41 kg/m    Body mass index is 28.41 kg/m .  Physical Exam   GENERAL: healthy, alert and no distress  NECK: no adenopathy, no asymmetry, masses, or scars and thyroid normal to palpation  RESP: lungs clear to auscultation - no rales, rhonchi or wheezes  CV: regular rate and rhythm, normal S1 S2, no S3 or S4, no murmur, click or rub, no peripheral edema and peripheral pulses strong  ABDOMEN: soft, nontender, no hepatosplenomegaly, no masses and bowel sounds normal  MS: no gross musculoskeletal defects noted, no edema

## 2021-05-24 ENCOUNTER — RECORDS - HEALTHEAST (OUTPATIENT)
Dept: ADMINISTRATIVE | Facility: OTHER | Age: 76
End: 2021-05-24

## 2021-05-24 ENCOUNTER — ANCILLARY PROCEDURE (OUTPATIENT)
Dept: GENERAL RADIOLOGY | Facility: CLINIC | Age: 76
End: 2021-05-24
Attending: STUDENT IN AN ORGANIZED HEALTH CARE EDUCATION/TRAINING PROGRAM
Payer: MEDICARE

## 2021-05-24 ENCOUNTER — RECORDS - HEALTHEAST (OUTPATIENT)
Dept: ADMINISTRATIVE | Facility: REHABILITATION | Age: 76
End: 2021-05-24

## 2021-05-24 ENCOUNTER — OFFICE VISIT (OUTPATIENT)
Dept: FAMILY MEDICINE | Facility: CLINIC | Age: 76
End: 2021-05-24
Payer: MEDICARE

## 2021-05-24 VITALS
OXYGEN SATURATION: 100 % | TEMPERATURE: 97.4 F | WEIGHT: 175.8 LBS | SYSTOLIC BLOOD PRESSURE: 129 MMHG | BODY MASS INDEX: 28.37 KG/M2 | RESPIRATION RATE: 20 BRPM | DIASTOLIC BLOOD PRESSURE: 72 MMHG | HEART RATE: 71 BPM

## 2021-05-24 DIAGNOSIS — M79.605 PAIN OF LEFT LOWER EXTREMITY: ICD-10-CM

## 2021-05-24 DIAGNOSIS — I73.9 CLAUDICATION (H): Primary | ICD-10-CM

## 2021-05-24 PROCEDURE — 99214 OFFICE O/P EST MOD 30 MIN: CPT | Mod: GC | Performed by: STUDENT IN AN ORGANIZED HEALTH CARE EDUCATION/TRAINING PROGRAM

## 2021-05-24 PROCEDURE — 73562 X-RAY EXAM OF KNEE 3: CPT | Mod: LT | Performed by: RADIOLOGY

## 2021-05-24 RX ORDER — LIDOCAINE 50 MG/G
OINTMENT TOPICAL PRN
Qty: 50 G | Refills: 0 | Status: SHIPPED | OUTPATIENT
Start: 2021-05-24

## 2021-05-24 ASSESSMENT — PAIN SCALES - GENERAL: PAINLEVEL: WORST PAIN (10)

## 2021-05-24 NOTE — PATIENT INSTRUCTIONS
Dr. Mckenzie    1. Xray today  2. Physical therapy  3. Take tylenol 1 g two times per day.   4. Ultrasound of your arteries.     21   ORDER: US ANKLE-ARM INDEX   [facility]   [phone]  Fax Orders to [phone]     Order faxed to [phone], they will contact patient to schedule.     Ileana Leary    21-   [facility] called to have physical therapy orders faxed to them at fax: [phone].     Ileana Leary

## 2021-05-24 NOTE — PROGRESS NOTES
Assessment & Plan     Pain of left lower extremity  Claudication  The etiology of the pain in patient's lower extremity is not clear.  He does not have DJD, though there is some chronic enthesis noted.  Given his history I do have suspicion for peripheral vascular disease as well.  Patient has had an allergic reaction to NSAIDs in the past and also has CKD making NSAIDs of poor choice even topical for treatment of his pain.  Will trial lidocaine and PT.  Will work-up with MYNOR given suspicion for PVD.  - PHYSICAL THERAPY REFERRAL; Future  - US Ankle-arm index; Future  - XR Knee Left 3 Views  - lidocaine (XYLOCAINE) 5 % external ointment; Apply topically as needed for moderate pain    Follow-up in 3 to 4 weeks  Eliezer Branch MD  [facility] NEYMAR Chavez is a 76 year old who presents for the following health issues  accompanied by his spouse:    HPI   Pt comes in for 1 month of L leg pain.  He describes the leg pain as sharp.  It is made worse with walking however it shows up randomly as well.  He states that the pain happens while sitting still.  He has difficulty localizing the pain in points to his thigh as well as his calf and his knee.  He states that the pain sometimes wakes him from sleep.  He sustained no trauma or injury to the leg.      Patient has a history of coronary artery disease, hypertension, and chronic kidney disease.  He has tried Tylenol for the pain but it has not helped much.      Review of Systems   Constitutional, HEENT, cardiovascular, pulmonary, gi and gu systems are negative, except as otherwise noted.      Objective    /72   Pulse 71   Temp 97.4  F (36.3  C) (Oral)   Resp 20   Wt 79.7 kg (175 lb 12.8 oz)   SpO2 100%   BMI 28.37 kg/m    Body mass index is 28.37 kg/m .  Physical Exam   GENERAL: healthy, alert and no distress  NECK: no adenopathy, no asymmetry, masses, or scars and thyroid normal to palpation  RESP: lungs clear to  auscultation - no rales, rhonchi or wheezes  CV: regular rate and rhythm, normal S1 S2, no S3 or S4, no murmur, click or rub, no peripheral edema and peripheral pulses strong  ABDOMEN: soft, nontender, no hepatosplenomegaly, no masses and bowel sounds normal  MS: no gross musculoskeletal defects noted, no edema.  His left leg is not swollen or edematous, there is no erythema, there is no palpable abnormalities, there is no tenderness to palpation flexion extension abduction at the hip does not reproduce pain, straight leg test negative, flexion extension at the knee does not reproduce pain, Homans test negative.  Bilateral lower extremity pulses are difficult to palpate.  There is no deficit in strength    Results for orders placed or performed in visit on 05/24/21   XR Knee Left 3 Views    Impression    IMPRESSION: The left knee is negative for fracture or significant compartmental narrowing. 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

## 2021-05-26 ENCOUNTER — RECORDS - HEALTHEAST (OUTPATIENT)
Dept: ADMINISTRATIVE | Facility: CLINIC | Age: 76
End: 2021-05-26

## 2021-05-26 ENCOUNTER — TELEPHONE (OUTPATIENT)
Dept: FAMILY MEDICINE | Facility: CLINIC | Age: 76
End: 2021-05-26

## 2021-05-26 NOTE — TELEPHONE ENCOUNTER
[facility] phone call message- general phone call:    Reason for call: Lidocaine is too expensive at their pharmacy they are wondering if there is anything else he can get prescribed.   Action desired: call back.    Return call needed: Yes    OK to leave a message on voice mail? Yes    Advised patient to response may take up to 2 business days: Yes       Primary language: English      needed? No    Call taken on May 26, 2021 at 9:20 AM by Vignesh Cook

## 2021-05-27 ENCOUNTER — RECORDS - HEALTHEAST (OUTPATIENT)
Dept: ADMINISTRATIVE | Facility: OTHER | Age: 76
End: 2021-05-27

## 2021-05-28 ENCOUNTER — RECORDS - HEALTHEAST (OUTPATIENT)
Dept: ADMINISTRATIVE | Facility: CLINIC | Age: 76
End: 2021-05-28

## 2021-05-29 ENCOUNTER — RECORDS - HEALTHEAST (OUTPATIENT)
Dept: ADMINISTRATIVE | Facility: CLINIC | Age: 76
End: 2021-05-29

## 2021-05-30 ENCOUNTER — RECORDS - HEALTHEAST (OUTPATIENT)
Dept: ADMINISTRATIVE | Facility: CLINIC | Age: 76
End: 2021-05-30

## 2021-05-31 ENCOUNTER — RECORDS - HEALTHEAST (OUTPATIENT)
Dept: ADMINISTRATIVE | Facility: CLINIC | Age: 76
End: 2021-05-31

## 2021-06-01 NOTE — PROGRESS NOTES
Preceptor Attestation:    I discussed the patient with the resident and evaluated the patient in person. I have verified the content of the note, which accurately reflects my assessment of the patient and the plan of care.   Supervising Physician:  Joey Mckenzie MD.

## 2021-06-04 ENCOUNTER — RECORDS - HEALTHEAST (OUTPATIENT)
Dept: VASCULAR ULTRASOUND | Facility: CLINIC | Age: 76
End: 2021-06-04

## 2021-06-04 DIAGNOSIS — I73.9 PERIPHERAL VASCULAR DISEASE, UNSPECIFIED (H): ICD-10-CM

## 2021-06-15 ENCOUNTER — OFFICE VISIT (OUTPATIENT)
Dept: FAMILY MEDICINE | Facility: CLINIC | Age: 76
End: 2021-06-15
Payer: MEDICARE

## 2021-06-15 VITALS
TEMPERATURE: 97.6 F | WEIGHT: 176.4 LBS | SYSTOLIC BLOOD PRESSURE: 130 MMHG | BODY MASS INDEX: 28.47 KG/M2 | OXYGEN SATURATION: 100 % | RESPIRATION RATE: 16 BRPM | HEART RATE: 71 BPM | DIASTOLIC BLOOD PRESSURE: 74 MMHG

## 2021-06-15 DIAGNOSIS — G47.00 INSOMNIA, UNSPECIFIED TYPE: ICD-10-CM

## 2021-06-15 DIAGNOSIS — M79.605 PAIN OF LEFT LOWER EXTREMITY: Primary | ICD-10-CM

## 2021-06-15 PROCEDURE — 99213 OFFICE O/P EST LOW 20 MIN: CPT | Mod: GC | Performed by: STUDENT IN AN ORGANIZED HEALTH CARE EDUCATION/TRAINING PROGRAM

## 2021-06-15 RX ORDER — HYDROXYZINE HYDROCHLORIDE 25 MG/1
25-50 TABLET, FILM COATED ORAL
Qty: 30 TABLET | Refills: 0 | Status: SHIPPED | OUTPATIENT
Start: 2021-06-15 | End: 2021-06-28

## 2021-06-15 NOTE — LETTER
[facility] [address] [place] [address]  [phone]      Sherlyn 15, 2021    RE:  Scott Gottlieb Sr.                                                                                                                                                       [address] [place] [address]            To whom it may concern:    Scott Gottlieb Sr. is under my professional care for primary care services. He sustained a fractured vertebrae in December of 2019 which resulted in several months of wearing a back brace and treatment with physical therapy. A Handicap sticker was applied for at that time. He has since done well with his treatment and is fully functional with regards to driving abilities. There is no medical rationale for limitation of this patient's driving privileges. He is from my perspective safe to operate a motor vehicle on public streets and highways .           Sincerely,        Eliezer Branch MD    [facility]

## 2021-06-15 NOTE — PROGRESS NOTES
Assessment & Plan     Pain of left lower extremity  This patient's leg pain has now been going on intermittently over the last month and 1/2 to 2 months.  Getting worse but not getting better either.  Today's description sounded almost more like sciatica however patient is already on gabapentin.  Could consider increasing gabapentin at next visit.  Patient has an aversion to taking medicines and would like to continue with PT and see if that helps.  Patient offered MRI of the spine to rule out radiculopathy but declined at this time.    Insomnia, unspecified type  Patient sleep difficulties are intermittent and often related to other medical conditions including his leg pain and at times persistent hiccuping.  Melatonin seems works pretty well and patient amenable to trying hydroxyzine for additional sleep benefit.  - hydrOXYzine (ATARAX) 25 MG tablet; Take 1-2 tablets (25-50 mg) by mouth nightly as needed for anxiety or other (sleep)    Follow-up after completion of PT or sooner if new or worsening symptoms  Eliezer Branch MD  [facility]    Miryam Chavez is a 76 year old who presents for the following health issues  accompanied by his spouse:    [facility]     Pt is here today for follow up of R leg pain. He has had persistent right leg pain for the past 1.5-2 months which is described as sharp and shooting. He has had MYNOR testing, x-ray, trial of lidocaine, and just started PT therapy.  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  Patient states that his pain is not getting worse but he discontinued.  He previously did not use which shooting to describe it but did today of note.  Patient also has had a history of vertebral fracture and has had continued back pain.  He states that he would like to continue working with PT and see if it helps.  He has an aversion to taking too many pills.    He is having difficulty sleeping due to pain in  his leg and recurring hiccups.  He takes melatonin which seems to work pretty well but some nights like last night he has difficulty sleeping.    Review of Systems   Constitutional, HEENT, cardiovascular, pulmonary, gi and gu systems are negative, except as otherwise noted.      Objective    /74 (BP Location: Right arm, Patient Position: Sitting, Cuff Size: Adult Regular)   Pulse 71   Temp 97.6  F (36.4  C) (Oral)   Resp 16   Wt 80 kg (176 lb 6.4 oz)   SpO2 100%   BMI 28.47 kg/m    Body mass index is 28.47 kg/m .  Physical Exam   GENERAL: healthy, alert and no distress  EYES: Eyes grossly normal to inspection, PERRL and conjunctivae and sclerae normal  NECK: no adenopathy, no asymmetry, masses, or scars and thyroid normal to palpation  MS: no gross musculoskeletal defects noted, no edema  SKIN: no suspicious lesions or rashes  NEURO: Normal strength and tone, mentation intact and speech normal  PSYCH: mentation appears normal, affect normal/bright

## 2021-06-23 DIAGNOSIS — I25.10 CORONARY ARTERY DISEASE INVOLVING NATIVE CORONARY ARTERY OF NATIVE HEART WITHOUT ANGINA PECTORIS: ICD-10-CM

## 2021-06-23 RX ORDER — ATORVASTATIN CALCIUM 40 MG/1
TABLET, FILM COATED ORAL
Qty: 90 TABLET | Refills: 1 | Status: SHIPPED | OUTPATIENT
Start: 2021-06-23 | End: 2022-01-13

## 2021-06-25 DIAGNOSIS — G47.00 INSOMNIA, UNSPECIFIED TYPE: ICD-10-CM

## 2021-06-25 DIAGNOSIS — F41.9 ANXIETY: Primary | ICD-10-CM

## 2021-06-28 RX ORDER — HYDROXYZINE HYDROCHLORIDE 25 MG/1
TABLET, FILM COATED ORAL
Qty: 180 TABLET | Refills: 1 | Status: SHIPPED | OUTPATIENT
Start: 2021-06-28

## 2021-07-05 ENCOUNTER — DOCUMENTATION ONLY (OUTPATIENT)
Dept: FAMILY MEDICINE | Facility: CLINIC | Age: 76
End: 2021-07-05

## 2021-07-05 ENCOUNTER — TRANSFERRED RECORDS (OUTPATIENT)
Dept: HEALTH INFORMATION MANAGEMENT | Facility: CLINIC | Age: 76
End: 2021-07-05

## 2021-07-05 NOTE — PROGRESS NOTES
To be completed in Nursing note:    Please reference list for forms that require a visit for completion.  Please remind patients that providers are given 3-5 business days to complete and return forms.      Form type: [employer]AN OF CARE AND CERT MEDICARE     Date form received: 21    Date form completed by Physician: 21    How was form returned to patient (mailed, faxed, or at  for patient to ): FAXED [phone]    Date form mailed/faxed/left at  for patient and sent to HIM for [email or web address]      Once form is left for patient, faxed, or mailed PCS will then close the documentation only encounter.     Sagar Haas, EMT  1:48 PM  2021

## 2021-07-15 VITALS
RESPIRATION RATE: 16 BRPM | SYSTOLIC BLOOD PRESSURE: 153 MMHG | DIASTOLIC BLOOD PRESSURE: 87 MMHG | OXYGEN SATURATION: 98 % | HEART RATE: 72 BPM

## 2021-07-15 VITALS
BODY MASS INDEX: 24.5 KG/M2 | SYSTOLIC BLOOD PRESSURE: 130 MMHG | DIASTOLIC BLOOD PRESSURE: 65 MMHG | WEIGHT: 175 LBS | WEIGHT: 175.7 LBS | BODY MASS INDEX: 24.6 KG/M2 | RESPIRATION RATE: 16 BRPM | OXYGEN SATURATION: 99 % | DIASTOLIC BLOOD PRESSURE: 62 MMHG | SYSTOLIC BLOOD PRESSURE: 117 MMHG | HEART RATE: 72 BPM | HEART RATE: 60 BPM | HEIGHT: 71 IN | HEIGHT: 71 IN

## 2021-07-15 VITALS
HEIGHT: 71 IN | HEART RATE: 71 BPM | RESPIRATION RATE: 16 BRPM | SYSTOLIC BLOOD PRESSURE: 138 MMHG | WEIGHT: 175 LBS | DIASTOLIC BLOOD PRESSURE: 66 MMHG | OXYGEN SATURATION: 98 % | BODY MASS INDEX: 24.5 KG/M2

## 2021-07-15 NOTE — PATIENT INSTRUCTIONS - HE
1. Continue your brace as you have been.   WEARING THE LUMBAR BRACE:    * Wear the brace when out of bed or sitting upright in bed.  * You should apply the brace before standing.  * You may shower seated without brace.   Sit down on shower chair, remove brace   Shower   Dry   Re-apply brace before standing.   Take care not to fall  *If your brace is not fitting call [facility] [phone]  *Check  your incision and the skin under your brace at least daily.    2. Continue your activity restrictions   *No lifting, pushing or pulling greater than 5-10 pound (this is about a gallon of milk).  *No repetitive bending, twisting, or jarring activities  *No aerobic or strenuous activity  *No activities with increased risk of falls  *You may move about your home as tolerated  *You may walk up and down stairs as tolerated    3. Return to clinic in 4 weeks with CT and XR     4. Call [phone] with the following symptoms:    *Worsening pain not relieved by the pain prescription given  *Worsening or new onset of weakness, or numbness and tingling  *Loss or change in your ability to control bowel or bladder function  *Change in your ability to walk, talk, see or think

## 2021-07-15 NOTE — PROGRESS NOTES
Progress Notes by Baljinder Jacinto MD at 1/13/2020  2:10 PM     Author: Baljinder Jacinto MD Service: -- Author Type: Physician    Filed: 1/13/2020  3:01 PM Encounter Date: 1/13/2020 Status: Signed    : Baljinder Jacinto MD (Physician)         Cardiology Clinic Office Note    Assessment / Plan:    1.  Syncopal episode, likely vasovagal with no evidence of an arrhythmic etiology and no evidence of ischemia.  Possibly triggered by strong smell  2.  Ischemic cardiomyopathy.  Mild with no evidence of ischemia    Plan follow-up in 1 year    ______________________________________________________________________    Subjective:    I had the opportunity to see Scott Gottlieb SrCharley at the [facility]. Scott Gottlieb Sr. is a 74 y.o. male with a history of coronary artery disease.  He had an inferior myocardial infarction in 1991 and subsequently underwent four-vessel bypass revascularization.  This consisted of left internal mammary artery graft left anterior descending, and separate saphenous vein grafts to the diagonal, circumflex obtuse marginal branch, and right posterior descending artery.  I met him when he was hospitalized for a syncopal episode last month.  Follow-up 14-day symptom triggered monitor did not show etiology of his syncopal episode.  Echocardiogram and stress test showed inferior infarct with an ejection fraction of 47% but no evidence of inducible ischemia.    He returns for follow-up today, accompanied by 2 daughters.  He has had no recurrent syncopal episodes.  He did have a couple of episodes of lightheadedness while he was wearing the monitor, no arrhythmias were associated.  He otherwise feels well.  He is frustrated at the limitations on his activity, they are predominantly because of his back in the spinal brace.  He does note sensitivity to odors which are longstanding, but does not recall any unusual odors the day he lost consciousness after eating  at a restaurant.    He sleeps well and snores lightly and has intermittent daytime sleepiness but this is not a pervasive issue.  He does not drink alcoholic beverages.  He strongly wishes to get back to visiting the casino    ______________________________________________________________________    Problem List:  Patient Active Problem List   Diagnosis   ? Syncope and collapse   ? Coronary artery disease involving native coronary artery of native heart without angina pectoris     Medical History:  Past Medical History:   Diagnosis Date   ? Coronary artery disease    ? Hyperlipidemia      Surgical History:  History reviewed. No pertinent surgical history.  Social History:  Social History     Socioeconomic History   ? Marital status:      Spouse name: Not on file   ? Number of children: Not on file   ? Years of education: Not on file   ? Highest education level: Not on file   Occupational History   ? Not on file   Social Needs   ? Financial resource strain: Not on file   ? Food insecurity:     Worry: Not on file     Inability: Not on file   ? Transportation needs:     Medical: Not on file     Non-medical: Not on file   Tobacco Use   ? Smoking status: Former Smoker     Types: Cigarettes   ? Smokeless tobacco: Never Used   ? Tobacco comment: Quit long time ago   Substance and Sexual Activity   ? Alcohol use: Never     Frequency: Never     Drinks per session: Patient refused     Binge frequency: Patient refused   ? Drug use: Never   ? Sexual activity: Not on file   Lifestyle   ? Physical activity:     Days per week: Not on file     Minutes per session: Not on file   ? Stress: Not on file   Relationships   ? Social connections:     Talks on phone: Not on file     Gets together: Not on file     Attends Yazidi service: Not on file     Active member of club or organization: Not on file     Attends meetings of clubs or organizations: Not on file     Relationship status: Not on file   ? Intimate partner violence:      Fear of current or ex partner: Not on file     Emotionally abused: Not on file     Physically abused: Not on file     Forced sexual activity: Not on file   Other Topics Concern   ? Not on file   Social History Narrative   ? Not on file     Sleep History:  Mostly restorative  Exercise History:  Walking.  Limited by back brace    Review of Systems:   General: WNL  Eyes: WNL  Ears/Nose/Throat: WNL  Lungs: Shortness of Breath  Heart: Arm Pain, Shortness of Breath with activity  Stomach: Constipation, Diarrhea, Heartburn, Nausea  Bladder: WNL  Muscle/Joints: Joint Pain  Skin: WNL  Nervous System: Daytime Sleepiness, Dizziness, Loss of Balance  Mental Health: Depression, Anxiety     Blood: WNL          Family History:  History reviewed. No pertinent family history.      Allergies:  Allergies   Allergen Reactions   ? Aspirin    ? Compazine [Prochlorperazine Edisylate]    ? Sulfa (Sulfonamide Antibiotics)      Medications:  Current Outpatient Medications   Medication Sig Dispense Refill   ? atorvastatin (LIPITOR) 40 MG tablet Take 1 tablet (40 mg total) by mouth at bedtime. 30 tablet 0   ? clopidogrel (PLAVIX) 75 mg tablet Take 75 mg by mouth daily.     ? cyanocobalamin 1000 MCG tablet Take 1,000 mcg by mouth daily.     ? ergocalciferol (ERGOCALCIFEROL) 1,250 mcg (50,000 unit) capsule Take 50,000 Units by mouth 2 (two) times a week.      ? latanoprost (XALATAN) 0.005 % ophthalmic solution Administer 1 drop to both eyes at bedtime.     ? lisinopril (PRINIVIL,ZESTRIL) 5 MG tablet Take 5 mg by mouth daily.     ? metFORMIN (GLUCOPHAGE-XR) 500 MG 24 hr tablet Take 2,000 mg by mouth daily with breakfast.     ? metoclopramide (REGLAN) 10 MG tablet Take 1 tablet (10 mg total) by mouth 3 (three) times a day as needed (hiccups). 10 tablet 0   ? pregabalin (LYRICA) 300 MG capsule Take 300 mg by mouth 2 (two) times a day.     ? timolol maleate (TIMOPTIC) 0.5 % ophthalmic solution Administer 1 drop to both eyes daily.     ?  "triamterene-hydrochlorothiazide (DYAZIDE) 37.5-25 mg per capsule Take 1 capsule by mouth every morning.     ? oxyCODONE (ROXICODONE) 5 MG immediate release tablet Take 1 tablet (5 mg total) by mouth every 6 (six) hours as needed for pain. 6 tablet 0     No current facility-administered medications for this visit.        Objective:   Wt Readings from Last 3 Encounters:   01/13/20 175 lb 11.2 oz (79.7 kg)   12/24/19 168 lb (76.2 kg)     Vital signs:  /62 (Patient Site: Left Arm, Patient Position: Sitting, Cuff Size: Adult Regular)   Pulse 60   Resp 16   Ht 5' 10.98\" (1.803 m)   Wt 175 lb 11.2 oz (79.7 kg)   BMI 24.52 kg/m        Physical Exam:    General Appearance : Awake, Alert, No acute distress  HEENT: No Scleral icterus; the mucous membranes were pink and moist.  Conjunctivae not injected  Neck:  No cervical bruits, jugular venous distention, or thyromegaly   Chest: The spine was straight. Chest wall symmetric  Lungs: Respirations unlabored; the lungs are clear to auscultation.  No wheezing   Cardiovascular:   Normal point of maximal impulse.  Auscultation reveals normal first and second heart sounds with no murmurs, rubs, or gallops.  Carotid, radial, and dorsalis pedal pulses are intact and symmetric.    Abdomen: No organomegaly, masses, bruits, or tenderness. Bowels sounds are present  Extremities:  No clubbing, cyanosis.  No edema  Skin: No rash, bruising  Musculoskeletal: No tenderness.  Neurologic: Alert and oriented ×3. Speech is fluent.    Lab Results:  LIPIDS:  Lab Results   Component Value Date    CHOL 188 12/22/2019     Lab Results   Component Value Date    HDL 29 (L) 12/22/2019     Lab Results   Component Value Date    LDLCALC  12/22/2019      Comment:      Invalid, Triglycerides >400     Lab Results   Component Value Date    TRIG 532 (H) 12/22/2019     Echocardiogram 12/2019:    Left ventricle ejection fraction is mildly decreased. The calculated left ventricular ejection fraction is 47%. " There is akinesis of the basal inferior and inferolateral walls.    Normal right ventricular size and systolic function.    No significant valvular heart disease.    No previous study for comparison.      14-day symptom triggered monitor 12/2019:  Symptoms of dizziness associated with normal sinus rhythm, rate 79  beats per minute on 01/04.  Some evidence for conduction system disease with  first-degree AV block and IVCD, possibly right bundle branch block noted.  Auto  transmission showed Mobitz type I second-degree AV block on 12/30 associated with  heart rate 66.  There were occasional other blocked atrial premature beats.   Bradycardia at a heart rate of 39 beats per minute noted on 12/27.  No atrial  fibrillation present.    Pharmacologic nuclear stress test 12/2019:  1.The nuclear stress test is abnormal.  ?  2..Negative pharmacological regadenoson ECG for ischemia.  ?  3.There is a large area of infarction in the mid to basal inferior and inferolateral segment(s) of the left ventricle.  No significant ischemia seen.  ?  4.There is no prior study for comparison.      CAPRI PLAZA MD [facility]  [phone]    This note created using Dragon voice recognition software.  Sound alike errors may have escaped editing.

## 2021-07-15 NOTE — PATIENT INSTRUCTIONS - HE
Wean from brace. Remove the brace for 1-2 hours a day, increasing each day by 1-2 hour increments.  If at any time during the wean you experience excessive fatigue, back pain or spasm, back down to the previous level for a day or so, then resume schedule.  Once out of brace for a full 8 hours, discontinue altogether or wear only as needed for comfort.     Okay to drive when you are out of your brace. Increase lifting restriction 5-10lbs a week as tolerated. No further follow up needed. Call with questions.

## 2021-07-15 NOTE — TELEPHONE ENCOUNTER
During the current COVID19 pandemic efforts are being made to decreased patient exposure by limiting their interactions with healthcare facilities, particularly in our higher risk (age > 60) patients. Efforts are being made to delay imaging where possible, and to engage in virtual/telephone visits whenever possible. Given these current constraints, a chart review has been completed and the follow up recs are as follows:    Ok to push CT and Xray out another 2 weeks. Can have telephone visit. Continue brace.       Plans for follow up will be altered accordingly in the case of patient request, change in neurological exam, worsening pain, worsening imaging.     Leonela XIE-ACNP  [facility]  [address], [address]  [place] [address]    O: [phone]  P: [phone]

## 2021-07-15 NOTE — LETTER
Letter by Joi Pisano CNP at      Author: Joi Pisano CNP Service: -- Author Type: --    Filed:  Encounter Date: 4/17/2020 Status: (Other)         April 17, 2020     Patient: Sctot Gottlieb Sr.   YOB: 1945   Date of Visit: 4/17/2020       To Whom It May Concern:    It is my medical opinion that Scott Gottlieb  is able to drive when he is out of his brace.    If you have any questions or concerns, please don't hesitate to call.    Sincerely,        Electronically signed by Joi Pisano CNP

## 2021-07-15 NOTE — PROGRESS NOTES
"Scott Gottlieb Sr. is a 75 y.o. male who is being evaluated via a billable telephone visit.      The patient has been notified of following:     \"This telephone visit will be conducted via a call between you and your physician/provider. We have found that certain health care needs can be provided without the need for a physical exam.  This service lets us provide the care you need with a short phone conversation.  If a prescription is necessary we can send it directly to your pharmacy.  If lab work is needed we can place an order for that and you can then stop by our lab to have the test done at a later time.    Telephone visits are billed at different rates depending on your insurance coverage. During this emergency period, for some insurers they may be billed the same as an in-person visit.  Please reach out to your insurance provider with any questions.    If during the course of the call the physician/provider feels a telephone visit is not appropriate, you will not be charged for this service.\"    Patient has given verbal consent to a Telephone visit? Yes    Patient would like to receive their AVS by AVS Preference: Mail a copy.    Additional provider notes:      ASSESSMENT :  CT with evidence of healing. Reviewed with Dr. Fishman - wean from brace.     PLAN:.  1. Wean from brace  2. Increase lifting restriction 5-10lbs a week  3. Increase activity as tolerated. No follow up needed.     TODAY, Scott Gottlieb Sr. reports no back pain, doing well.      PAST MEDICAL HISTORY, SURGICAL HISTORY, REVIEW OF SYMPTOMS, MEDICATIONS AND ALLERGIES:  Past medical history, surgical history, ROS, medications and allergies reviewed with patient and remain unchanged from previous visit.    Past Medical History:   Diagnosis Date     Coronary artery disease      Diabetes mellitus (H)      Hyperlipidemia      Exam as reported by patient- no back pain, leg pain or weakness, no new neuro deficits.      RADIOGRAPHIC IMAGING: " Thoracic CT personally visualized.   Interval signs of healing at T3-4 fracture. Discussed and reviewed with Dr. Fishman.      Joi Pisano CNP  [facility]surgery  O: [phone]  P: [phone]    Phone call duration: 10 minutes    Joi Pisano, CNP

## 2021-07-15 NOTE — PATIENT INSTRUCTIONS - HE
1. No changes in your medications today  2. Make a schedule for your self to exercise 20 to 30 minutes each day.  Try a variety of different exercises to help keep your interest up.  This will also help to prevent you from getting more and more stiff.

## 2021-07-15 NOTE — PROGRESS NOTES
CHART NOTE     DATE OF SERVICE:  2020     : 1945   74 y.o.     Attending neurosurgeon : Dr. Garcia     ASSESSMENT :   No back pain, has been wearing his brace.  CT with some evidence of healing, fracture plane still present. Initial injury 2019.     PLAN:.  1. Follow up CT in 4-6 weeks to evaluate T3/4 osteophyte fracture for healing, upright imaging to eval alignment - hopefully fracture will be healed and we can wean from brace.   2. Continue brace and restrictions  3. Should not drive with brace in place  4. Hopefully wean from brace at next visit.   HPI:  Scott Gottlieb Sr. is status post hospital consult for T3-4 fracture through the anterior bridging osteophyte in the setting of DISH on 2019.    Patient initially presented with syncopal episode and fall.      TODAY, Scott Gottlieb Sr. reports no back pain, he is hoping to come out of his brace today.      PAST MEDICAL HISTORY, SURGICAL HISTORY, REVIEW OF SYMPTOMS, MEDICATIONS AND ALLERGIES:  Past medical history, surgical history, ROS, medications and allergies reviewed with patient and remain unchanged from previous visit.    Past Medical History:   Diagnosis Date     Coronary artery disease      Diabetes mellitus (H)      Hyperlipidemia        PHYSICAL EXAM:    /87   Pulse 72   Resp 16   SpO2 98%     Neurological exam reveals:  Respirations easy, non-labored.   Skin: W/D/I. No rashes, lesions or breaks in integrity.   Recent and remote memory intact, fund of knowledge wnl.    Alert and oriented x3, speech fluent and appropriate.   PERRL, EOMI, No nystagmus,   Arm strength bilateral grasp, biceps, triceps, and deltoids 5/5   Leg strength bilateral dorsiflexion, plantar flexion, and hip flexion 5/5  No extremity edema noted.   Can heel/toe walk, do toe rises and squats without difficulty.   Muscle Bulk and tone wnl.   Gait and station:Normal     RADIOGRAPHIC IMAGING: Thoracic CT personally visualized.   EXAM: CT THORACIC SPINE  WO CONTRAST  LOCATION: [facility]  DATE/TIME: 2/17/2020 11:34 AM     INDICATION: Follow-up T3 fracture, DISH.  COMPARISON: None.  TECHNIQUE: Routine without IV contrast. Multiplanar reformats. Dose reduction techniques were used.     FINDINGS: Again seen are extensive bridging anterior osteophytosis consistent with DISH.     There is a fracture of the anterior bridging osteophyte at T3-T4 which was also noted on the previous examination. New from the prior examination is sclerosis extending in a linear fashion from the fracture into the right aspect of T4. Findings are   consistent with bony remodeling at a fracture, but there is no evidence for loss of height or change in alignment. No new fractures are identified. MRI would be helpful for evaluation of the presence or absence of bone edema if indicated.     IMPRESSION:   CONCLUSION:  1. Stable changes of DISH with flowing/bridging osteophytosis along the anterior margin of the thoracolumbar spine.  2. Again seen is a fracture of the bridging osteophyte at T3-T4. This was also noted on the prior examination.  3. New from the prior study is evidence of some reactive sclerosis and bony remodeling and changes of healing extending into the right aspect of T4, but there is no evidence for loss of vertebral body height or change in alignment. MRI could be performed   to assess for the presence or absence of associated bone edema.  4 Extensive vascular calcifications.     Joi Pisano CNP  [facility]surgery  O: [phone]  P: [phone]

## 2021-07-15 NOTE — TELEPHONE ENCOUNTER
PATIENT NAME:  Scott Gottlieb Sr.  YOB: 1945  MRN: [id number]  SURGEON: Dr. Garcia   DATE of CONSULT:  12-22-19  DIAGNOSIS:  ankylosing spondylitis w syncopal event resulting in nondisplaced fracture of the anterior bridging osteophyte at T3-T4 which extends into the superior endplate and right lateral cortex of T4. He is intact. Plan to treat in brace.     FOLLOW-UP:  Post HOSPITAL CONSULT F/U Visit: 2 weeks - 3 WEEKS  Post-op Provider: NP/[facility]  DIAGNOSTICS:  radiology: X-Ray: THORACIC, AP/LAT UPRIGHT IN BRACE  DISPOSITION:  HOME WITH WIFE AFTER CARDIAC WORK UP.    ADDITIONAL INSTRUCTIONS FOR MEDICAL STAFF:    Must wear brace whenever seated upright or OOB.   minimum assist (75% patients effort)

## 2021-07-15 NOTE — PROGRESS NOTES
"CHART NOTE     DATE OF SERVICE:  1/15/2020     : 1945   74 y.o.     Attending neurosurgeon : Dr. Garcia     ASSESSMENT :   No significant back pain, has been wearing his brace.  X-rays stable in alignment but fracture line not wildly visible.    PLAN:.  1. Follow up CT in 4-6 weeks to evaluate T3 fracture for healing, fracture not well visualized on plain films  2. Continue brace and restrictions  3. Follow up in trauma clinic  4. Should not drive with brace in place  5. Did discuss that we typically brace for 12 weeks and that he may not be able to come out of the brace at next visit.   HPI:  Scott Gottlieb Sr. is status post hospital consult for T3-4 fracture through the anterior bridging osteophyte in the setting of DISH on 2019.    Patient initially presented with syncopal episode and fall.      TODAY, Scott Gottlieb Sr. reports no back pain, annoyance with the brace     PAST MEDICAL HISTORY, SURGICAL HISTORY, REVIEW OF SYMPTOMS, MEDICATIONS AND ALLERGIES:  Past medical history, surgical history, ROS, medications and allergies reviewed with patient and remain unchanged from previous visit.    Past Medical History:   Diagnosis Date     Coronary artery disease      Diabetes mellitus (H)      Hyperlipidemia        PHYSICAL EXAM:    /65   Pulse 72   Ht 5' 10.98\" (1.803 m)   Wt 175 lb (79.4 kg)   SpO2 99%   BMI 24.42 kg/m      Neurological exam reveals:  Respirations easy, non-labored.   Skin: W/D/I. No rashes, lesions or breaks in integrity.   Recent and remote memory intact, fund of knowledge wnl.    Alert and oriented x3, speech fluent and appropriate.   PERRL, EOMI, No nystagmus,   Arm strength bilateral grasp, biceps, triceps, and deltoids 5/5   Leg strength bilateral dorsiflexion, plantar flexion, and hip flexion 5/5  No extremity edema noted.   Can heel/toe walk, do toe rises and squats without difficulty.   Muscle Bulk and tone wnl.   Reflexes: No pathological reflexes   Gait and " station:Normal     RADIOGRAPHIC IMAGING: Plain x-rays Films personally reviewed and interpreted. .   Reviewed imaging with patient and family.      EXAM: XR THORACIC SPINE 2 VWS  LOCATION: [facility]  DATE/TIME: 1/15/2020 9:32 AM     INDICATION: 2-3 weeks post T3-T4 fracture 12-22-19  COMPARISON: 12/22/2019 and 12/21/2019     IMPRESSION:   Redemonstration of diffuse idiopathic skeletal hyperostosis throughout the thoracic spine. The known nondisplaced fracture through the anterior bridging osteophyte at T3-T4 extending into the T4 superior endplate is better demonstrated on   recent prior CT. No interval height loss at the T3 or T4 vertebral body. Overall vertebral body heights and lateral alignment are maintained. Mild broad dextrocurvature of the thoracic spine. Mild to moderate degenerative spondylosis is unchanged. Prior   median sternotomy and changes of CABG again noted. The inferior most sternotomy wire is again noted to be fractured. Visualized ribs and soft tissues are unremarkable. Evaluation of the lungs is limited secondary to technique.      Joi Pisano CNP  [facility]  O: [phone]  P: [phone]

## 2021-07-15 NOTE — PROGRESS NOTES
Progress Notes by Baljinder Jacinto MD at 2/12/2021  1:10 PM     Author: Baljinder Jacinto MD Service: -- Author Type: Physician    Filed: 2/12/2021  1:53 PM Encounter Date: 2/12/2021 Status: Signed    : Baljinder Jacinto MD (Physician)         Cardiology Clinic Office Note    Assessment / Plan:    1.  Ischemic cardiomyopathy.  Mild with no evidence of ischemia  2.  Sedentary lifestyle.  Reviewed the benefits of a regular moderate exercise program.  He will discuss with his primary care provider whether depression could be contributing to his inactivity.    3.  Syncopal episode, likely vasovagal with no evidence of an arrhythmic etiology and no evidence of ischemia.  Possibly triggered by strong smell.  No recurrence.      Plan follow-up in 1 year    ______________________________________________________________________    Subjective:    I had the opportunity to see Scott Gottlieb SrCharley at the [facility]. Scott Gottlieb Sr. is a 75 y.o. male with a history of coronary artery disease.  He had an inferior myocardial infarction in 1991 and subsequently underwent four-vessel bypass revascularization.  This consisted of left internal mammary artery graft left anterior descending, and separate saphenous vein grafts to the diagonal, circumflex obtuse marginal branch, and right posterior descending artery.  He developed syncope about a year ago, suspected to be triggered by a strong smell.  Follow-up 14-day symptom triggered monitor did not show etiology of his syncopal episode.  Echocardiogram and stress test showed inferior infarct with an ejection fraction of 47% but no evidence of inducible ischemia.  He returns today for routine follow-up, accompanied by daughter    Over the past year, he has not experienced any chest pain.  He is somewhat fearful of falling and is not exercising as he once was.  He does admit to being depressed by the restrictions and his isolation.  His  weight is stable.  He has not had any further syncopal episodes.  He has a stationary bicycle as well as a treadmill, but is not motivated to use them.  Weight is stable      ______________________________________________________________________    Problem List:  Patient Active Problem List   Diagnosis   ? Syncope and collapse   ? Coronary artery disease involving native coronary artery of native heart without angina pectoris     Medical History:  Past Medical History:   Diagnosis Date   ? Coronary artery disease    ? Diabetes mellitus (H)    ? Hyperlipidemia      Surgical History:  Past Surgical History:   Procedure Laterality Date   ? CARDIAC SURGERY       Social History:  Social History     Socioeconomic History   ? Marital status:      Spouse name: Not on file   ? Number of children: Not on file   ? Years of education: Not on file   ? Highest education level: Not on file   Occupational History   ? Not on file   Social Needs   ? Financial resource strain: Not on file   ? Food insecurity     Worry: Not on file     Inability: Not on file   ? Transportation needs     Medical: Not on file     Non-medical: Not on file   Tobacco Use   ? Smoking status: Former Smoker     Types: Cigarettes   ? Smokeless tobacco: Never Used   ? Tobacco comment: Quit long time ago   Substance and Sexual Activity   ? Alcohol use: Never     Frequency: Never     Drinks per session: Patient refused     Binge frequency: Patient refused   ? Drug use: Never   ? Sexual activity: Not on file   Lifestyle   ? Physical activity     Days per week: Not on file     Minutes per session: Not on file   ? Stress: Not on file   Relationships   ? Social connections     Talks on phone: Not on file     Gets together: Not on file     Attends [employer] service: Not on file     Active member of club or organization: Not on file     Attends meetings of clubs or organizations: Not on file     Relationship status: Not on file   ? Intimate partner violence      Fear of current or ex partner: Not on file     Emotionally abused: Not on file     Physically abused: Not on file     Forced sexual activity: Not on file   Other Topics Concern   ? Not on file   Social History Narrative   ? Not on file     Sleep History:  Mostly restorative.  Naps about once a week does note trouble falling asleep  Exercise History:  Walking some, but has trouble getting motivated to be active    Review of Systems:   General: WNL  Eyes: WNL  Ears/Nose/Throat: WNL  Lungs: WNL  Heart: Shortness of Breath with activity  Stomach: WNL  Bladder: WNL  Muscle/Joints: WNL  Skin: WNL  Nervous System: Loss of Balance  Mental Health: WNL     Blood: WNL          Family History:  Family History   Problem Relation Age of Onset   ? Varicose Veins Mother    ? Hypertension Sister    ? Heart attack Brother    ? Hypertension Brother          Allergies:  Allergies   Allergen Reactions   ? Aspirin    ? Compazine [Prochlorperazine Edisylate]    ? Sulfa (Sulfonamide Antibiotics)      Medications:  Current Outpatient Medications   Medication Sig Dispense Refill   ? AMITIZA 24 mcg capsule Take 1 mcg by mouth daily.     ? atorvastatin (LIPITOR) 40 MG tablet Take 1 tablet (40 mg total) by mouth at bedtime. 30 tablet 0   ? clopidogrel (PLAVIX) 75 mg tablet Take 75 mg by mouth daily.     ? cyanocobalamin 1000 MCG tablet Take 1,000 mcg by mouth daily.     ? ergocalciferol (ERGOCALCIFEROL) 1,250 mcg (50,000 unit) capsule Take 50,000 Units by mouth 2 (two) times a week.      ? latanoprost (XALATAN) 0.005 % ophthalmic solution Administer 1 drop to both eyes at bedtime.     ? levothyroxine (SYNTHROID, LEVOTHROID) 50 MCG tablet Take 50 mcg by mouth daily.     ? lisinopril (PRINIVIL,ZESTRIL) 5 MG tablet Take 5 mg by mouth daily.     ? metFORMIN (GLUCOPHAGE-XR) 500 MG 24 hr tablet Take 2,000 mg by mouth daily with breakfast.     ? ONETOUCH ULTRA BLUE TEST STRIP strips see administration instructions.     ? pregabalin (LYRICA) 300 MG  "capsule Take 300 mg by mouth 2 (two) times a day.     ? timolol maleate (TIMOPTIC) 0.5 % ophthalmic solution Administer 1 drop to both eyes daily.     ? triamterene-hydrochlorothiazide (DYAZIDE) 37.5-25 mg per capsule Take 1 capsule by mouth every morning.     ? famotidine (FOR PEPCID) 10 MG tablet Take 10 mg by mouth 2 (two) times a day as needed.     ? JARDIANCE 25 mg Tab Take 1 tablet by mouth daily.     ? montelukast (SINGULAIR) 10 mg tablet Take 10 mg by mouth daily.       No current facility-administered medications for this visit.        Objective:   Wt Readings from Last 3 Encounters:   02/12/21 175 lb (79.4 kg)   01/15/20 175 lb (79.4 kg)   01/13/20 175 lb 11.2 oz (79.7 kg)     Vital signs:  /66 (Patient Site: Left Arm, Patient Position: Sitting, Cuff Size: Adult Regular)   Pulse 71   Resp 16   Ht 5' 10.98\" (1.803 m)   Wt 175 lb (79.4 kg) Comment: With shoes.  SpO2 98%   BMI 24.42 kg/m        Physical Exam:    General Appearance : Awake, Alert, No acute distress  HEENT: No Scleral icterus; the mucous membranes were pink and moist.  Conjunctivae bilaterally injected  Neck:  No cervical bruits, jugular venous distention, or thyromegaly   Chest: The spine was straight. Chest wall symmetric  Lungs: Respirations unlabored; the lungs are clear to auscultation.  No wheezing   Cardiovascular:   Normal point of maximal impulse.  Auscultation reveals normal first and second heart sounds with no murmurs, rubs, or gallops.  Carotid, radial, and dorsalis pedal pulses are intact and symmetric.  Abdomen: No organomegaly, masses, bruits, or tenderness. Bowels sounds are present  Extremities:  No clubbing, cyanosis.  No edema  Skin: No rash, bruising  Musculoskeletal: No tenderness.  Neurologic: Alert and oriented ×3. Speech is fluent.  Good eye contact    Lab Results:  LIPIDS:  Lab Results   Component Value Date    CHOL 188 12/22/2019     Lab Results   Component Value Date    HDL 29 (L) 12/22/2019     Lab " Results   Component Value Date    LDLCALC  12/22/2019      Comment:      Invalid, Triglycerides >400     Lab Results   Component Value Date    TRIG 532 (H) 12/22/2019     Echocardiogram 12/2019:    Left ventricle ejection fraction is mildly decreased. The calculated left ventricular ejection fraction is 47%. There is akinesis of the basal inferior and inferolateral walls.    Normal right ventricular size and systolic function.    No significant valvular heart disease.    No previous study for comparison.      14-day symptom triggered monitor 12/2019:  Symptoms of dizziness associated with normal sinus rhythm, rate 79  beats per minute on 01/04.  Some evidence for conduction system disease with  first-degree AV block and IVCD, possibly right bundle branch block noted.  Auto  transmission showed Mobitz type I second-degree AV block on 12/30 associated with  heart rate 66.  There were occasional other blocked atrial premature beats.   Bradycardia at a heart rate of 39 beats per minute noted on 12/27.  No atrial  fibrillation present.    Pharmacologic nuclear stress test 12/2019:  1.The nuclear stress test is abnormal.  ?  2..Negative pharmacological regadenoson ECG for ischemia.  ?  3.There is a large area of infarction in the mid to basal inferior and inferolateral segment(s) of the left ventricle.  No significant ischemia seen.  ?  4.There is no prior study for comparison.      CAPRI PLAZA MD [facility]  [phone]    This note created using Dragon voice recognition software.  Sound alike errors may have escaped editing.

## 2021-07-15 NOTE — TELEPHONE ENCOUNTER
Called and spoke with Scott who verbalized agreement with his treatment plan.  Alexandra Obregon RN, CNRN

## 2021-07-15 NOTE — LETTER
Letter by Baljinder Jacinto MD at      Author: Baljinder Jacinto MD Service: -- Author Type: --    Filed:  Encounter Date: 12/28/2020 Status: (Other)         Scott Gottlieb Sr.  [address] [place] [address]      December 28, 2020      Dear Scott,    This letter is to remind you that you will be due for your follow up appointment with Dr. Baljinder Jacinto in January, 2021. To help ensure you are in the best health possible, a regular follow-up with your cardiologist is essential.     Please call our Patient Scheduling Line at [phone] to schedule your appointment at your earliest convenience.  If you have recently scheduled an appointment, please disregard this letter.    We look forward to seeing you again. As always, we are available at the number  above for any questions or concerns you may have.      Sincerely,     The Physicians and Staff of [facility]

## 2021-07-15 NOTE — PATIENT INSTRUCTIONS - HE
Continue brace and restrictions. Brace should be worn while out of bed or upright. I will order a CT in 4-6 weeks to check your healing. WEARING THE LUMBAR BRACE:    * Wear the brace when out of bed or sitting upright in bed.  * You should apply the brace before standing.  * You may shower seated without brace.   Sit down on shower chair, remove brace   Shower   Dry   Re-apply brace before standing.   Take care not to fall  *If your brace is not fitting call [facility] [phone]  *Check  your incision and the skin under your brace at least daily.    Warning:   Call [phone] with the following symptoms:  *Worsening pain not relieved by the pain prescription given  *Worsening or new onset of weakness, or numbness and tingling  *Loss or change in your ability to control bowel or bladder function  *Change in your ability to walk, talk, see or think    !!!! IF YOU HAVE A SERIOUS OR THREATENING EMERGENCY, CALL 911 OR COME TO THE EMERGENCY ROOM.  IF YOUR CONDITIONS ALLOWS COME TO THE HOSPITAL WHERE YOUR SURGERY WAS PERFORMED.

## 2021-07-15 NOTE — PATIENT INSTRUCTIONS - HE
Continue brace when out of bed or upright. Your fracture is healing well. Hopefully at your next visit, we can take you out of your brace. I ordered a CT scan for your next visit.     *No lifting, pushing or pulling greater than 5-10 pound (this is about a gallon of milk).  *No repetitive bending, twisting, or jarring activities  *No overhead work  *No aerobic or strenuous activity  *No activities with increased risk of falls  *You may move about your home as tolerated  *You may walk up and down stairs as tolerated

## 2021-07-15 NOTE — PROGRESS NOTES
"Scott Gottlieb Sr. is a 74 y.o. male who is being evaluated via a billable telephone visit.      The patient has been notified of following:     \"This telephone visit will be conducted via a call between you and your physician/provider. We have found that certain health care needs can be provided without the need for a physical exam.  This service lets us provide the care you need with a short phone conversation.      Scott Gottlieb Sr. has zero complaints. He has been compliant with his brace. No lower extremity symptoms. No change in bowel or bladder. No pain.     I have reviewed and updated the patient's Past Medical History, Social History, Family History and Medication List. No changes.     ALLERGIES  Aspirin; Compazine [prochlorperazine edisylate]; and Sulfa (sulfonamide antibiotics)    Telephone interview conducted with patient, his wife, and their daughter.     Assessment/Plan:  Scott Gottlieb is a 74 year old male we have been following for a DISH T3-4 fracture, treated in a brace, since 12/21/2020 followed by Dr Garcia. CT and XR today reviewed and compared to prior. Their is good alignment of the spine. Fracture line still present but does have sign of healing. Discussed with patient and family need to continue brace and reevaluate in 4 weeks. They understand risk of removing the brace too early. They are in agreement to continue with the plan of care. They request a list of recommendation be mailed to their home which we will do.     Return in 4 weeks with CT and XR  Continue brace  Continue activity restrictions  Notify neurosurgery with change in pain or mobility or with any questions.     Phone call duration: 8 minutes  Additional time spent reviewing prior imaging and office visits. Discussed images, plan of care with Dr Fishman today who was present in the office who is in agreement with the plan.     Minnie Bonner, [facility]    "

## 2021-07-19 ENCOUNTER — DOCUMENTATION ONLY (OUTPATIENT)
Dept: FAMILY MEDICINE | Facility: CLINIC | Age: 76
End: 2021-07-19

## 2021-07-19 ENCOUNTER — TRANSFERRED RECORDS (OUTPATIENT)
Dept: HEALTH INFORMATION MANAGEMENT | Facility: CLINIC | Age: 76
End: 2021-07-19

## 2021-07-19 NOTE — PROGRESS NOTES
To be completed in Nursing note:     Please reference list for forms that require a visit for completion.  Please remind patients that providers are given 3-5 business days to complete and return forms.        Form type: [employer] PLAN OF CARE AND CERT MEDICARE      Date form received: 7/15/21     Date form completed by Physician: 21     How was form returned to patient (mailed, faxed, or at  for patient to ): FAXED [phone]     Date form mailed/faxed/left at  for patient and sent to HIM for [email or web address]        Once form is left for patient, faxed, or mailed PCS will then close the documentation only encounter.      Sagar Haas EMT  8:13 AM  2021

## 2021-08-15 ENCOUNTER — HEALTH MAINTENANCE LETTER (OUTPATIENT)
Age: 76
End: 2021-08-15

## 2021-10-10 ENCOUNTER — HEALTH MAINTENANCE LETTER (OUTPATIENT)
Age: 76
End: 2021-10-10

## 2021-12-05 ENCOUNTER — HEALTH MAINTENANCE LETTER (OUTPATIENT)
Age: 76
End: 2021-12-05

## 2021-12-21 DIAGNOSIS — I25.10 CORONARY ARTERY DISEASE INVOLVING NATIVE CORONARY ARTERY OF NATIVE HEART WITHOUT ANGINA PECTORIS: ICD-10-CM

## 2022-01-12 NOTE — TELEPHONE ENCOUNTER
Patient's partner called in to follow up on this refill. Patient is now out of this medication.    Pouted to CELSA Sheikh

## 2022-01-13 RX ORDER — ATORVASTATIN CALCIUM 40 MG/1
TABLET, FILM COATED ORAL
Qty: 90 TABLET | Refills: 1 | Status: SHIPPED | OUTPATIENT
Start: 2022-01-13 | End: 2022-07-13

## 2022-03-24 LAB
ALBUMIN (URINE) MG/SPEC: 1.6 MG/DL
ALBUMIN/CREATININE RATIO: 16 MCG/MG CREAT
ALT SERPL-CCNC: 12 U/L (ref 9–46)
AST SERPL-CCNC: 11 U/L (ref 10–35)
CHOLESTEROL (EXTERNAL): 128 MG/DL
CREATININE (EXTERNAL): 1.79 MG/DL (ref 0.7–1.18)
CREATININE (URINE): 101 MG/DL (ref 20–320)
GFR ESTIMATED (EXTERNAL): 36 ML/MIN/1.73M2
GFR ESTIMATED (IF AFRICAN AMERICAN) (EXTERNAL): 41 ML/MIN/1.73M2
GLUCOSE (EXTERNAL): 89 MG/DL (ref 65–99)
HBA1C MFR BLD: 6.7 %
HDLC SERPL-MCNC: 32 MG/DL
LDL CHOLESTEROL CALCULATED (EXTERNAL): 64 MG/DL
NON HDL CHOLESTEROL (EXTERNAL): 96 MG/DL
POTASSIUM (EXTERNAL): 4.7 MMOL/L (ref 3.5–5.3)
TRIGLYCERIDES (EXTERNAL): 302 MG/DL
TSH SERPL-ACNC: 4.22 MU/L (ref 0.4–4.5)

## 2022-03-26 ENCOUNTER — TRANSFERRED RECORDS (OUTPATIENT)
Dept: HEALTH INFORMATION MANAGEMENT | Facility: CLINIC | Age: 77
End: 2022-03-26

## 2022-03-27 ENCOUNTER — HEALTH MAINTENANCE LETTER (OUTPATIENT)
Age: 77
End: 2022-03-27

## 2022-06-14 ENCOUNTER — OFFICE VISIT (OUTPATIENT)
Dept: CARDIOLOGY | Facility: CLINIC | Age: 77
End: 2022-06-14
Payer: MEDICARE

## 2022-06-14 VITALS
BODY MASS INDEX: 23.86 KG/M2 | WEIGHT: 171 LBS | RESPIRATION RATE: 16 BRPM | DIASTOLIC BLOOD PRESSURE: 52 MMHG | HEART RATE: 68 BPM | SYSTOLIC BLOOD PRESSURE: 120 MMHG | OXYGEN SATURATION: 99 %

## 2022-06-14 DIAGNOSIS — I25.10 CORONARY ARTERY DISEASE INVOLVING NATIVE CORONARY ARTERY OF NATIVE HEART WITHOUT ANGINA PECTORIS: ICD-10-CM

## 2022-06-14 DIAGNOSIS — I25.5 ISCHEMIC CARDIOMYOPATHY: ICD-10-CM

## 2022-06-14 DIAGNOSIS — R55 SYNCOPE AND COLLAPSE: Primary | ICD-10-CM

## 2022-06-14 PROCEDURE — 99214 OFFICE O/P EST MOD 30 MIN: CPT | Performed by: INTERNAL MEDICINE

## 2022-06-14 RX ORDER — LATANOPROST 50 UG/ML
1 SOLUTION/ DROPS OPHTHALMIC
COMMUNITY

## 2022-06-14 RX ORDER — LEVOTHYROXINE SODIUM 50 UG/1
50 TABLET ORAL DAILY
COMMUNITY

## 2022-06-14 RX ORDER — ERGOCALCIFEROL 1.25 MG/1
50000 CAPSULE ORAL
COMMUNITY

## 2022-06-14 NOTE — LETTER
6/14/2022    Eliezer [place] MD  [address] [place] [id number]    RE: Scott Gottlieb SrCharley       Dear Colleague,     I had the pleasure of seeing Scott Gottlieb Sr. in the [facility].    Cardiology Clinic Office Note    Assessment / Plan:    1.  Mild ischemic cardiomyopathy more than 30 years out from bypass revascularization.  Progressive disease could certainly be contributing to exertional dyspnea and fatigue.  We will check pharmacologic nuclear stress test to look for evidence of disease progression.  2.  Daytime sleepiness.  Gradually progressive over the last couple of years.  Agrees to sleep apnea evaluation.    Follow-up 1 year or sooner for test abnormalities.    ______________________________________________________________________    Subjective:    I had the opportunity to see Scott Gottlieb Sr. at the [facility]. Scott Gottlieb Sr. is a 77 year old male with a history of coronary artery disease.  He had an inferior myocardial infarction in 1991 and subsequently underwent four-vessel bypass revascularization.  This consisted of left internal mammary artery graft left anterior descending, and separate saphenous vein grafts to the diagonal, circumflex obtuse marginal branch, and right posterior descending artery.  He developed syncope in 2019, suspected to be triggered by a strong smell.  Follow-up 14-day symptom triggered monitor did not show etiology of his syncopal episode. Echocardiogram and stress test showed inferior infarct with an ejection fraction of 47% but no evidence of inducible ischemia.  He returns today for routine follow-up, accompanied by his wife.  He is quite hard of hearing.    Since I last saw him, he has had no further syncopal episodes.  His activities are decreased as he becomes more short of breath with less activity.  He does mow the lawn but has to stop while mowing when he walks up a hill.  He has had no chest  pain.  He has had no syncope or falls.  He does feel that he is sleepy all day long, often sleeping in his chair.  Wife notes that he snores in the chair but has not noticed apnea.  He has never had a sleep apnea evaluation.    ______________________________________________________________________    Problem List:  Patient Active Problem List   Diagnosis     Coronary artery disease involving native coronary artery of native heart without angina pectoris     CAD (coronary artery disease)     B12 deficiency     CRF (chronic renal failure)     HTN (hypertension)     Syncope and collapse     Type 2 diabetes mellitus (H)     Chronic kidney disease, stage 3 (H)     Ischemic cardiomyopathy     Medical History:  Past Medical History:   Diagnosis Date     Anxiety      Coronary artery disease      Coronary artery disease      Diabetes mellitus (H)      Diabetes mellitus, type 2 (H)      Hyperlipidemia      Hypertension      Surgical History:  Past Surgical History:   Procedure Laterality Date     CARDIAC SURGERY       Social History:  Social History     Socioeconomic History     Marital status:      Spouse name: Not on file     Number of children: Not on file     Years of education: Not on file     Highest education level: Not on file   Occupational History     Not on file   Tobacco Use     Smoking status: Never Smoker     Smokeless tobacco: Never Used   Substance and Sexual Activity     Alcohol use: Never     Drug use: Never     Sexual activity: Not on file   Other Topics Concern     Not on file   Social History Narrative     Not on file     Social Determinants of Health     Financial Resource Strain: Not on file   Food Insecurity: Not on file   Transportation Needs: Not on file   Physical Activity: Not on file   Stress: Not on file   Social Connections: Not on file   Intimate Partner Violence: Not on file   Housing Stability: Not on file     Sleep History:  Poorly restorative with daytime sleepiness  Exercise  History:  Mows the lawn.  Activities otherwise limited by fatigue    Review of Systems:      Positive for hearing loss, shortness of breath with activity, constipation, diarrhea, nocturia, loss of balance.  12 point review of systems otherwise negative     Please refer above for cardiac ROS details.         Family History:  Family History   Problem Relation Age of Onset     Cancer No family hx of      Diabetes No family hx of      Varicose Veins Mother      Hypertension Sister      Coronary Artery Disease Brother      Hypertension Brother          Allergies:  Allergies   Allergen Reactions     Aspirin Hives     Compazine [Prochlorperazine] Hives     Sulfa Drugs Hives     Nepafenac Anxiety and Hives     Medications:  Current Outpatient Medications   Medication Sig Dispense Refill     allopurinol (ZYLOPRIM) 300 MG tablet Take 300 mg by mouth daily       atorvastatin (LIPITOR) 40 MG tablet TAKE 1 TABLET BY MOUTH EVERYDAY AT BEDTIME (Patient taking differently: Take 20 mg by mouth daily) 90 tablet 1     clopidogrel (PLAVIX) 75 MG tablet Take 75 mg by mouth daily       cyanocobalamin (VITAMIN B-12) 1000 MCG tablet Take 1,000 mcg by mouth daily       ergocalciferol (ERGOCALCIFEROL) 1.25 MG (22196 UT) capsule Take 50,000 Units by mouth       gabapentin (NEURONTIN) 100 MG capsule Take 100 mg by mouth 3 times daily as needed       hydrOXYzine (ATARAX) 25 MG tablet TAKE 1-2 TABLETS (25-50 MG) BY MOUTH NIGHTLY AS NEEDED FOR ANXIETY OR OTHER (SLEEP). NOT COVERED 180 tablet 1     latanoprost (XALATAN) 0.005 % ophthalmic solution Apply 1 drop to eye At Bedtime       levothyroxine (SYNTHROID/LEVOTHROID) 50 MCG tablet Take 50 mcg by mouth daily       lidocaine (XYLOCAINE) 5 % external ointment Apply topically as needed for moderate pain 50 g 0     lisinopril (PRINIVIL/ZESTRIL) 5 MG tablet Take 5 mg by mouth daily       metFORMIN (GLUCOPHAGE-XR) 500 MG 24 hr tablet Take 2,000 mg by mouth daily (with breakfast)       pregabalin  (LYRICA) 300 MG capsule Take 300 mg by mouth 2 times daily       timolol maleate (TIMOPTIC) 0.5 % ophthalmic solution Place 1 drop into both eyes daily       triamterene-HCTZ (DYAZIDE) 37.5-25 MG capsule Take 1 capsule by mouth every morning       famotidine (PEPCID) 10 MG tablet Take 1 tablet (10 mg) by mouth 2 times daily as needed (Patient not taking: Reported on 6/14/2022) 30 tablet 0     latanoprost (XALATAN) 0.005 % ophthalmic solution 1 drop (Patient not taking: Reported on 6/14/2022)       timolol maleate (TIMOPTIC) 0.25 % ophthalmic solution Apply 1 drop to eye daily (Patient not taking: Reported on 6/14/2022)         Objective:   Wt Readings from Last 3 Encounters:   06/14/22 77.6 kg (171 lb)   06/15/21 80 kg (176 lb 6.4 oz)   05/24/21 79.7 kg (175 lb 12.8 oz)     Vital signs:  /52 (BP Location: Left arm, Patient Position: Sitting, Cuff Size: Adult Regular)   Pulse 68   Resp 16   Wt 77.6 kg (171 lb)   SpO2 99%   BMI 23.86 kg/m        Physical Exam:    General Appearance : Awake, Alert, No acute distress  HEENT: No Scleral icterus; the mucous membranes were pink and moist.  Conjunctivae not injected  Neck:  No cervical bruits, jugular venous distention, or thyromegaly  Chest: The spine was straight.  Well-healed midline sternal incision.  Lungs: Respirations unlabored; the lungs are clear to auscultation.  No wheezing   Cardiovascular:   Normal point of maximal impulse.  Auscultation reveals regular first and second heart sounds with no murmurs, rubs, or gallops.  Carotid, radial, and dorsalis pedal pulses are intact and symmetric.  Abdomen: No organomegaly, masses, bruits, or tenderness. Bowels sounds are present  Extremities:  No clubbing, cyanosis.  No edema  Skin: No rash, bruising  Musculoskeletal: No tenderness.  Neurologic: Alert and oriented ×3. Speech is fluent.  Inappropriate laughter at times.    Lab Results:  LIPIDS:  Lab Results   Component Value Date    CHOL 144.7 02/19/2021    CHOL  188 12/22/2019     Lab Results   Component Value Date    HDL 30.1 (L) 02/19/2021    HDL 29 (L) 12/22/2019     Lab Results   Component Value Date    LDL 66 02/19/2021    LDL  12/22/2019      Comment:      Invalid, Triglycerides >400     Lab Results   Component Value Date    TRIG 242.7 (H) 02/19/2021    TRIG 532 (H) 12/22/2019       Echocardiogram 12/2019:    Left ventricle ejection fraction is mildly decreased. The calculated left ventricular ejection fraction is 47%. There is akinesis of the basal inferior and inferolateral walls.    Normal right ventricular size and systolic function.    No significant valvular heart disease.    No previous study for comparison.        14-day symptom triggered monitor 12/2019:  Symptoms of dizziness associated with normal sinus rhythm, rate 79  beats per minute on 01/04.  Some evidence for conduction system disease with  first-degree AV block and IVCD, possibly right bundle branch block noted.  Auto  transmission showed Mobitz type I second-degree AV block on 12/30 associated with heart rate 66.  There were occasional other blocked atrial premature beats.   Bradycardia at a heart rate of 39 beats per minute noted on 12/27.  No atrial  fibrillation present.    Pharmacologic nuclear stress test 12/2019:     1.The nuclear stress test is abnormal.     2..Negative pharmacological regadenoson ECG for ischemia.     3.There is a large area of infarction in the mid to basal inferior and inferolateral segment(s) of the left ventricle.  No significant ischemia seen.        CAPRI PLAZA MD [facility]  [phone]    This note created using Dragon voice recognition software.  Sound alike errors may have escaped editing.    Thank you for allowing me to participate in the care of your patient.      Sincerely,     Capri Plaza MD     [facility]  cc:   Capri Plaza MD  [address]  [address]  [place] [address]

## 2022-06-14 NOTE — PATIENT INSTRUCTIONS
We will schedule a repeat stress test to see if progressive coronary disease is causing your fatigue and shortness of breath with activity.  No medication changes today.  Please call [phone] to schedule Sleep consultation.  Another option is [facility] at [phone].

## 2022-06-14 NOTE — PROGRESS NOTES
Cardiology Clinic Office Note    Assessment / Plan:    1.  Mild ischemic cardiomyopathy more than 30 years out from bypass revascularization.  Progressive disease could certainly be contributing to exertional dyspnea and fatigue.  We will check pharmacologic nuclear stress test to look for evidence of disease progression.  2.  Daytime sleepiness.  Gradually progressive over the last couple of years.  Agrees to sleep apnea evaluation.    Follow-up 1 year or sooner for test abnormalities.    ______________________________________________________________________    Subjective:    I had the opportunity to see Scott Gottlieb SrCharley at the [facility]. Scott Gottlieb Sr. is a 77 year old male with a history of coronary artery disease.  He had an inferior myocardial infarction in 1991 and subsequently underwent four-vessel bypass revascularization.  This consisted of left internal mammary artery graft left anterior descending, and separate saphenous vein grafts to the diagonal, circumflex obtuse marginal branch, and right posterior descending artery.  He developed syncope in 2019, suspected to be triggered by a strong smell.  Follow-up 14-day symptom triggered monitor did not show etiology of his syncopal episode. Echocardiogram and stress test showed inferior infarct with an ejection fraction of 47% but no evidence of inducible ischemia.  He returns today for routine follow-up, accompanied by his wife.  He is quite hard of hearing.    Since I last saw him, he has had no further syncopal episodes.  His activities are decreased as he becomes more short of breath with less activity.  He does mow the lawn but has to stop while mowing when he walks up a hill.  He has had no chest pain.  He has had no syncope or falls.  He does feel that he is sleepy all day long, often sleeping in his chair.  Wife notes that he snores in the chair but has not noticed apnea.  He has never had a sleep apnea  evaluation.    ______________________________________________________________________    Problem List:  Patient Active Problem List   Diagnosis     Coronary artery disease involving native coronary artery of native heart without angina pectoris     CAD (coronary artery disease)     B12 deficiency     CRF (chronic renal failure)     HTN (hypertension)     Syncope and collapse     Type 2 diabetes mellitus (H)     Chronic kidney disease, stage 3 (H)     Ischemic cardiomyopathy     Medical History:  Past Medical History:   Diagnosis Date     Anxiety      Coronary artery disease      Coronary artery disease      Diabetes mellitus (H)      Diabetes mellitus, type 2 (H)      Hyperlipidemia      Hypertension      Surgical History:  Past Surgical History:   Procedure Laterality Date     CARDIAC SURGERY       Social History:  Social History     Socioeconomic History     Marital status:      Spouse name: Not on file     Number of children: Not on file     Years of education: Not on file     Highest education level: Not on file   Occupational History     Not on file   Tobacco Use     Smoking status: Never Smoker     Smokeless tobacco: Never Used   Substance and Sexual Activity     Alcohol use: Never     Drug use: Never     Sexual activity: Not on file   Other Topics Concern     Not on file   Social History Narrative     Not on file     Social Determinants of Health     Financial Resource Strain: Not on file   Food Insecurity: Not on file   Transportation Needs: Not on file   Physical Activity: Not on file   Stress: Not on file   Social Connections: Not on file   Intimate Partner Violence: Not on file   Housing Stability: Not on file     Sleep History:  Poorly restorative with daytime sleepiness  Exercise History:  Mows the lawn.  Activities otherwise limited by fatigue    Review of Systems:      Positive for hearing loss, shortness of breath with activity, constipation, diarrhea, nocturia, loss of balance.  12 point  review of systems otherwise negative     Please refer above for cardiac ROS details.         Family History:  Family History   Problem Relation Age of Onset     Cancer No family hx of      Diabetes No family hx of      Varicose Veins Mother      Hypertension Sister      Coronary Artery Disease Brother      Hypertension Brother          Allergies:  Allergies   Allergen Reactions     Aspirin Hives     Compazine [Prochlorperazine] Hives     Sulfa Drugs Hives     Nepafenac Anxiety and Hives     Medications:  Current Outpatient Medications   Medication Sig Dispense Refill     allopurinol (ZYLOPRIM) 300 MG tablet Take 300 mg by mouth daily       atorvastatin (LIPITOR) 40 MG tablet TAKE 1 TABLET BY MOUTH EVERYDAY AT BEDTIME (Patient taking differently: Take 20 mg by mouth daily) 90 tablet 1     clopidogrel (PLAVIX) 75 MG tablet Take 75 mg by mouth daily       cyanocobalamin (VITAMIN B-12) 1000 MCG tablet Take 1,000 mcg by mouth daily       ergocalciferol (ERGOCALCIFEROL) 1.25 MG (14934 UT) capsule Take 50,000 Units by mouth       gabapentin (NEURONTIN) 100 MG capsule Take 100 mg by mouth 3 times daily as needed       hydrOXYzine (ATARAX) 25 MG tablet TAKE 1-2 TABLETS (25-50 MG) BY MOUTH NIGHTLY AS NEEDED FOR ANXIETY OR OTHER (SLEEP). NOT COVERED 180 tablet 1     latanoprost (XALATAN) 0.005 % ophthalmic solution Apply 1 drop to eye At Bedtime       levothyroxine (SYNTHROID/LEVOTHROID) 50 MCG tablet Take 50 mcg by mouth daily       lidocaine (XYLOCAINE) 5 % external ointment Apply topically as needed for moderate pain 50 g 0     lisinopril (PRINIVIL/ZESTRIL) 5 MG tablet Take 5 mg by mouth daily       metFORMIN (GLUCOPHAGE-XR) 500 MG 24 hr tablet Take 2,000 mg by mouth daily (with breakfast)       pregabalin (LYRICA) 300 MG capsule Take 300 mg by mouth 2 times daily       timolol maleate (TIMOPTIC) 0.5 % ophthalmic solution Place 1 drop into both eyes daily       triamterene-HCTZ (DYAZIDE) 37.5-25 MG capsule Take 1 capsule by  mouth every morning       famotidine (PEPCID) 10 MG tablet Take 1 tablet (10 mg) by mouth 2 times daily as needed (Patient not taking: Reported on 6/14/2022) 30 tablet 0     latanoprost (XALATAN) 0.005 % ophthalmic solution 1 drop (Patient not taking: Reported on 6/14/2022)       timolol maleate (TIMOPTIC) 0.25 % ophthalmic solution Apply 1 drop to eye daily (Patient not taking: Reported on 6/14/2022)         Objective:   Wt Readings from Last 3 Encounters:   06/14/22 77.6 kg (171 lb)   06/15/21 80 kg (176 lb 6.4 oz)   05/24/21 79.7 kg (175 lb 12.8 oz)     Vital signs:  /52 (BP Location: Left arm, Patient Position: Sitting, Cuff Size: Adult Regular)   Pulse 68   Resp 16   Wt 77.6 kg (171 lb)   SpO2 99%   BMI 23.86 kg/m        Physical Exam:    General Appearance : Awake, Alert, No acute distress  HEENT: No Scleral icterus; the mucous membranes were pink and moist.  Conjunctivae not injected  Neck:  No cervical bruits, jugular venous distention, or thyromegaly  Chest: The spine was straight.  Well-healed midline sternal incision.  Lungs: Respirations unlabored; the lungs are clear to auscultation.  No wheezing   Cardiovascular:   Normal point of maximal impulse.  Auscultation reveals regular first and second heart sounds with no murmurs, rubs, or gallops.  Carotid, radial, and dorsalis pedal pulses are intact and symmetric.  Abdomen: No organomegaly, masses, bruits, or tenderness. Bowels sounds are present  Extremities:  No clubbing, cyanosis.  No edema  Skin: No rash, bruising  Musculoskeletal: No tenderness.  Neurologic: Alert and oriented ×3. Speech is fluent.  Inappropriate laughter at times.    Lab Results:  LIPIDS:  Lab Results   Component Value Date    CHOL 144.7 02/19/2021    CHOL 188 12/22/2019     Lab Results   Component Value Date    HDL 30.1 (L) 02/19/2021    HDL 29 (L) 12/22/2019     Lab Results   Component Value Date    LDL 66 02/19/2021    LDL  12/22/2019      Comment:      Invalid,  Triglycerides >400     Lab Results   Component Value Date    TRIG 242.7 (H) 02/19/2021    TRIG 532 (H) 12/22/2019       Echocardiogram 12/2019:    Left ventricle ejection fraction is mildly decreased. The calculated left ventricular ejection fraction is 47%. There is akinesis of the basal inferior and inferolateral walls.    Normal right ventricular size and systolic function.    No significant valvular heart disease.    No previous study for comparison.        14-day symptom triggered monitor 12/2019:  Symptoms of dizziness associated with normal sinus rhythm, rate 79  beats per minute on 01/04.  Some evidence for conduction system disease with  first-degree AV block and IVCD, possibly right bundle branch block noted.  Auto  transmission showed Mobitz type I second-degree AV block on 12/30 associated with heart rate 66.  There were occasional other blocked atrial premature beats.   Bradycardia at a heart rate of 39 beats per minute noted on 12/27.  No atrial  fibrillation present.    Pharmacologic nuclear stress test 12/2019:     1.The nuclear stress test is abnormal.     2..Negative pharmacological regadenoson ECG for ischemia.     3.There is a large area of infarction in the mid to basal inferior and inferolateral segment(s) of the left ventricle.  No significant ischemia seen.        CAPRI PLAZA MD [facility]  [phone]    This note created using Dragon voice recognition software.  Sound alike errors may have escaped editing.

## 2022-06-30 ENCOUNTER — TRANSFERRED RECORDS (OUTPATIENT)
Dept: HEALTH INFORMATION MANAGEMENT | Facility: CLINIC | Age: 77
End: 2022-06-30

## 2022-07-13 DIAGNOSIS — I25.10 CORONARY ARTERY DISEASE INVOLVING NATIVE CORONARY ARTERY OF NATIVE HEART WITHOUT ANGINA PECTORIS: ICD-10-CM

## 2022-07-13 RX ORDER — ATORVASTATIN CALCIUM 40 MG/1
TABLET, FILM COATED ORAL
Qty: 90 TABLET | Refills: 1 | Status: SHIPPED | OUTPATIENT
Start: 2022-07-13 | End: 2023-01-10

## 2022-07-13 NOTE — TELEPHONE ENCOUNTER
You are getting this encounter due to PCP or attending status. Please route any following actions required to your designated MA.    Sagar GRAEC, EMT

## 2022-07-13 NOTE — TELEPHONE ENCOUNTER
Called no answer.  Left message to call us back to reschedule appt to check lipid levels in the clinic within 1 month.      Norris Boykin MA

## 2022-07-16 ENCOUNTER — HEALTH MAINTENANCE LETTER (OUTPATIENT)
Age: 77
End: 2022-07-16

## 2022-07-20 ENCOUNTER — OFFICE VISIT (OUTPATIENT)
Dept: FAMILY MEDICINE | Facility: CLINIC | Age: 77
End: 2022-07-20
Payer: MEDICARE

## 2022-07-20 VITALS
HEART RATE: 63 BPM | DIASTOLIC BLOOD PRESSURE: 78 MMHG | BODY MASS INDEX: 23.97 KG/M2 | RESPIRATION RATE: 16 BRPM | TEMPERATURE: 97.7 F | SYSTOLIC BLOOD PRESSURE: 143 MMHG | WEIGHT: 171.8 LBS | OXYGEN SATURATION: 99 %

## 2022-07-20 DIAGNOSIS — L29.9 ITCHY SCALP: ICD-10-CM

## 2022-07-20 DIAGNOSIS — H61.93 EARLOBE LESION, BILATERAL: ICD-10-CM

## 2022-07-20 DIAGNOSIS — Z13.220 LIPID SCREENING: Primary | ICD-10-CM

## 2022-07-20 DIAGNOSIS — E11.9 TYPE 2 DIABETES MELLITUS WITHOUT COMPLICATION, WITHOUT LONG-TERM CURRENT USE OF INSULIN (H): ICD-10-CM

## 2022-07-20 DIAGNOSIS — N18.30 STAGE 3 CHRONIC KIDNEY DISEASE, UNSPECIFIED WHETHER STAGE 3A OR 3B CKD (H): ICD-10-CM

## 2022-07-20 PROBLEM — I25.5 ISCHEMIC CARDIOMYOPATHY: Status: ACTIVE | Noted: 2021-02-12

## 2022-07-20 LAB
CHOLEST SERPL-MCNC: 136 MG/DL
HDLC SERPL-MCNC: 31 MG/DL
LDLC SERPL CALC-MCNC: 55 MG/DL
NONHDLC SERPL-MCNC: 105 MG/DL
TRIGL SERPL-MCNC: 249 MG/DL

## 2022-07-20 PROCEDURE — 80061 LIPID PANEL: CPT

## 2022-07-20 PROCEDURE — 36415 COLL VENOUS BLD VENIPUNCTURE: CPT

## 2022-07-20 PROCEDURE — 99214 OFFICE O/P EST MOD 30 MIN: CPT | Mod: GC

## 2022-07-20 RX ORDER — BLOOD SUGAR DIAGNOSTIC
STRIP MISCELLANEOUS
COMMUNITY
Start: 2022-07-07

## 2022-07-20 RX ORDER — DIAPER,BRIEF,INFANT-TODD,DISP
EACH MISCELLANEOUS 2 TIMES DAILY
Qty: 56 G | Refills: 1 | Status: SHIPPED | OUTPATIENT
Start: 2022-07-20 | End: 2023-08-30

## 2022-07-20 RX ORDER — FINERENONE 10 MG/1
TABLET, FILM COATED ORAL
COMMUNITY
Start: 2022-07-01

## 2022-07-20 RX ORDER — KETOCONAZOLE 20 MG/ML
SHAMPOO TOPICAL DAILY PRN
Qty: 120 ML | Refills: 1 | Status: SHIPPED | OUTPATIENT
Start: 2022-07-20 | End: 2023-08-30

## 2022-07-20 NOTE — PROGRESS NOTES
Preceptor Attestation:  I discussed the patient with the resident and evaluated the patient in person. I have verified the content of the note, which accurately reflects my assessment of the patient and the plan of care.  Supervising Physician:  Anne Marie Sherman MD.

## 2022-07-20 NOTE — PROGRESS NOTES
Assessment & Plan       Itchy scalp  Patient has been having intermittent itching of scalp and is concerned it is a side effect of his metformin.  No rash, dry skin, lesions noted on exam.  Reassured patient very unlikely to be side effect of medication.  - ketoconazole (NIZORAL) 2 % external shampoo; Apply topically daily as needed for itching or irritation    Earlobe lesion, bilateral  Dark brown, irregular border on bilateral outer tragus of ears.  Per daughter, color has changed in intensity but size has not.  Mildly rough to touch.  May benefit from liquid nitrogen therapy in the future.  - hydrocortisone (CORTAID) 0.5 % external ointment; Apply topically 2 times daily  -Follow-up to monitor later in the summer  -Utilize sunscreen and broad brimmed hat that covers ears    Type 2 diabetes  CKD stage III  Patient was advised by endocrinologist to start new medication called Kerendia 10 mg.  Patient is concerned that there might be side effects with his current medications as he is on multiple other diuretics/medications that interact with the kidney.  While I counseled patient that this likely is an okay medication to start, to help set his mind at ease as he was not comfortable with the idea of trying and seeing if he had side effects decided to ask pharmacy for more assistance.  -Consult pharmacy, recs appreciated  -Follow-up in 1 week for recommendations    DMV form for driving eligibility  Patient has a past medical history of neck injury requiring c-collar for a brief period of time.  This has since resolved.  However, ever since the DMV has required him to periodically have a form signed by physician certifying that he is still cleared for driving.  We did not have time to discuss this in depth at this visit, but he does need this paperwork completed before September.  -Follow-up in clinic in 1 week for us to focus on this in a specific visit    Lipid screening  Patient due for lipid screen.  - Lipid  Profile  Ordering of each unique test  Prescription drug management  45 minutes spent on the date of the encounter doing chart review, history and exam, documentation and further activities per the note       Take all medications as directed.  Wait to take Kerendia until pharmacy consult completed  Follow-up in 1 week for recommendations and for DMV form filled out    Rohini Mahoney MD  [facility] NEYMAR Chavez is a 77 year old accompanied by his spouse and daughter, presenting for the following health issues:  Ear Problem (Pt has spots on his ears that popped up two weeks ago.  They were black, they are getting better but still there. ), Hair/Scalp Problem (Pt has spots on his scalp that family would like looked at. ), Letter Request (Pt would like a new letter written for the DMV stating he is okay to drive. ), and Medication Question (Pt has questions on his medication that the diabetes doctor prescribed.  He asked the pharmacist questions and would like a second opinion. )      HPI   Patient has multiple complaints today.  Explained that we will get to his many is very able to, but that we will likely need a secondary visit to complete all of these.    Patient has been asked firmly but lovingly by wife and daughter to come in for evaluation of dark spots on both ears.  First noticed about 3 weeks ago by daughter and wife.  Reports some soreness, but mostly just dark darkening of spots on ear.  The intensity of the darkness has intensified and faded over the past few weeks but the size has not changed.  Lesions do not itch he reports.  He thinks he might have rubbed his ear and sleep.  Patient is not particularly concerned about this    Patient also reports itchiness of scalp.  He does not know if there is a rash on his head, but he feels very itchy intermittently, and would like to know if it is a side effect of his metformin.  He has no other concerns with metformin, and  otherwise tolerates the drug well.  Just really does not want to be itchy anymore.  The itching comes and goes, does not seem to occur when he is leaning against a particular material, or if he is near any particular object.  Occurs outside and inside the home.  Itches mostly around the occiput and upper neck.    Patient also requires a form to be filled out for the DMV.  Dr. Branch in the past completed this every year.  In the past, patient had an injury to his neck that required a c-collar.  This has since resolved, and patient no longer needs this.  However, the DMV requires that his PCP sign a form periodically to state that he is still safe to drive.  Daughter and wife do not have any concerns about cognition, vision, attention at the wheel.  However, this is my first time interacting with this patient.  And I do not know him has well as Dr. Branch did.  Informed family we will likely need to do this visit separately.  Forms need to be completed by September, so we will do this soon.    Finally, patient was told by endocrinologist to start a new medication called Kerendia.  Patient asked the pharmacist when they were picking up the medication what it did exactly, and after hearing it affects his kidneys, patient grew nervous in light of the fact he is on lots of other medicines that affect his kidneys.  Would like to talk it over with a medical professional about the safety of adding this medicine to his current medications.  He is not eager to do a trial of it and potentially risk side effects and then stop the medication.  Would like to know before he starts if anything bad is going to happen.    Patient is overall a very pleasant and spunky older gentleman with a great sense of humor.    Review of Systems   Patient reports itchy scalp, bilateral dark brown spots on ears, difficulty hearing (baseline).    Patient denies fever, chills, chest pain, shortness of breath, vision changes, rash, nausea,  vomiting, diarrhea, bowel habit changes, dysuria, balance concerns, weakness, fatigue.      Objective    BP (!) 143/78 (BP Location: Left arm, Patient Position: Sitting, Cuff Size: Adult Regular)   Pulse 63   Temp 97.7  F (36.5  C) (Oral)   Resp 16   Wt 77.9 kg (171 lb 12.8 oz)   SpO2 99%   BMI 23.97 kg/m    Body mass index is 23.97 kg/m .  Physical Exam   GENERAL: healthy, alert and no distress  HENT: hard of hearing, bilateral, irregular border light brown lesion [id number] along bilateral tragus, mildly rough to touch, thick hair, no rashes or excoriations noted in area of patient concern. No nits, bites, rash, redness, or dry skin noted along scalp.   NECK: no adenopathy, no asymmetry, masses, or scars and thyroid normal to palpation  RESP: lungs clear to auscultation - no rales, rhonchi or wheezes  CV: regular rate and rhythm, normal S1 S2, no S3 or S4, no murmur, click or rub, no peripheral edema and peripheral pulses strong  ABDOMEN: soft, nontender, no hepatosplenomegaly, no masses and bowel sounds normal  MS: no gross musculoskeletal defects noted, no edema  SKIN:  bilateral, irregular border light brown lesion [id number] along bilateral tragus, mildly rough to touch, no noted rash on neck or scalp  PSYCH: mentation appears normal, affect normal/bright, sense of humor    Results for orders placed or performed in visit on 07/20/22 (from the past 24 hour(s))   Lipid Profile   Result Value Ref Range    Cholesterol 136 <200 mg/dL    Triglycerides 249 (H) <150 mg/dL    Direct Measure HDL 31 (L) >=40 mg/dL    LDL Cholesterol Calculated 55 <=100 mg/dL    Non HDL Cholesterol 105 <130 mg/dL    Narrative    Cholesterol  Desirable:  <200 mg/dL    Triglycerides  Normal:  Less than 150 mg/dL  Borderline High:  150-199 mg/dL  High:  200-499 mg/dL  Very High:  Greater than or equal to 500 mg/dL    Direct Measure HDL  Female:  Greater than or equal to 50 mg/dL   Male:  Greater than or equal to 40 mg/dL    LDL  Cholesterol  Desirable:  <100mg/dL  Above Desirable:  100-129 mg/dL   Borderline High:  130-159 mg/dL   High:  160-189 mg/dL   Very High:  >= 190 mg/dL    Non HDL Cholesterol  Desirable:  130 mg/dL  Above Desirable:  130-159 mg/dL  Borderline High:  160-189 mg/dL  High:  190-219 mg/dL  Very High:  Greater than or equal to 220 mg/dL       ----- Service Performed and Documented by Resident or Fellow ------            .  ..

## 2022-08-05 ENCOUNTER — OFFICE VISIT (OUTPATIENT)
Dept: FAMILY MEDICINE | Facility: CLINIC | Age: 77
End: 2022-08-05
Payer: MEDICARE

## 2022-08-05 VITALS
OXYGEN SATURATION: 99 % | WEIGHT: 171.8 LBS | BODY MASS INDEX: 29.33 KG/M2 | RESPIRATION RATE: 16 BRPM | SYSTOLIC BLOOD PRESSURE: 136 MMHG | TEMPERATURE: 97.5 F | HEART RATE: 66 BPM | HEIGHT: 64 IN | DIASTOLIC BLOOD PRESSURE: 70 MMHG

## 2022-08-05 DIAGNOSIS — Z00.00 ENCOUNTER FOR MEDICARE ANNUAL WELLNESS EXAM: Primary | ICD-10-CM

## 2022-08-05 DIAGNOSIS — H91.13 PRESBYCUSIS OF BOTH EARS: ICD-10-CM

## 2022-08-05 PROCEDURE — G0009 ADMIN PNEUMOCOCCAL VACCINE: HCPCS

## 2022-08-05 PROCEDURE — G0439 PPPS, SUBSEQ VISIT: HCPCS | Mod: GC

## 2022-08-05 PROCEDURE — 90677 PCV20 VACCINE IM: CPT

## 2022-08-05 ASSESSMENT — ENCOUNTER SYMPTOMS
EYE PAIN: 0
SHORTNESS OF BREATH: 1
JOINT SWELLING: 0
FREQUENCY: 0
COUGH: 0
DIARRHEA: 0
CHILLS: 0
PARESTHESIAS: 0
HEMATURIA: 0
WEAKNESS: 0
ABDOMINAL PAIN: 0
HEMATOCHEZIA: 0
PALPITATIONS: 0
MYALGIAS: 0
ARTHRALGIAS: 1
NERVOUS/ANXIOUS: 0
NAUSEA: 0
HEARTBURN: 0
HEADACHES: 1
DYSURIA: 0
SORE THROAT: 0
DIZZINESS: 1
CONSTIPATION: 0
FEVER: 0

## 2022-08-05 ASSESSMENT — ACTIVITIES OF DAILY LIVING (ADL): CURRENT_FUNCTION: NO ASSISTANCE NEEDED

## 2022-08-05 NOTE — LETTER
8/5/2022     To                                                                       and Vehicle Services   Evaluation  [address] [place] [id number]    Re: Scott Gottlieb Sr.       1945         MR#: [id number]   [address] [place] [address]      I have examined Mr. Scott Gottlieb Sr. on  8/5/22.  In my opinion his physcial condition permits him to exert reasonable control over a motor vehicle in a safe and independent manner. His neck is no longer a concern and he has full motion to be able to look in all mirrors.     Comments:   None    Please contact this office if more information is required.    Rohini Mahoney MD  [facility] [address] [place] [address]  Phone: [phone]  Fax: [phone]

## 2022-08-05 NOTE — PATIENT INSTRUCTIONS
Personalized Prevention Plan  You are due for the preventive services outlined below.  Your care team is available to assist you in scheduling these services.  If you have already completed any of these items, please share that information with your care team to update in your medical record.  Health Maintenance Due   Topic Date Due    Kidney Microalbumin Urine Test  Never done    Diabetic Foot Exam  Never done    Eye Exam  Never done    Urine Test  Never done    Pneumococcal Vaccine (1 - PCV) Never done    Zoster (Shingles) Vaccine (1 of 2) Never done    Depression Assessment  07/06/2020    Hemoglobin  12/22/2020    A1C Lab  05/19/2021    Basic Metabolic Panel  02/19/2022    COVID-19 Vaccine (4 - Booster for Pfizer series) 02/25/2022     Preventive Health Recommendations  See your health care provider every year to  Review health changes.   Discuss preventive care.    Review your medicines if your doctor has prescribed any.  Talk with your health care provider about whether you should have a test to screen for prostate cancer (PSA).  Every 3 years, have a diabetes test (fasting glucose). If you are at risk for diabetes, you should have this test more often.  Every 5 years, have a cholesterol test. Have this test more often if you are at risk for high cholesterol or heart disease.   Every 10 years, have a colonoscopy. Or, have a yearly FIT test (stool test). These exams will check for colon cancer.  Talk to with your health care provider about screening for Abdominal Aortic Aneurysm if you have a family history of AAA or have a history of smoking.    Shots:   Get a flu shot each year.   Get a tetanus shot every 10 years.   Talk to your doctor about your pneumonia vaccines. There are now two you should receive - Pneumovax (PPSV 23) and Prevnar (PCV 13).  Talk to your pharmacist about a shingles vaccine.   Talk to your doctor about the hepatitis B vaccine.    Nutrition:   Eat at least 5 servings of fruits and  vegetables each day.   Eat whole-grain bread, whole-wheat pasta and brown rice instead of white grains and rice.   Get adequate Calcium and Vitamin D.     Lifestyle  Exercise for at least 150 minutes a week (30 minutes a day, 5 days a week). This will help you control your weight and prevent disease.   Limit alcohol to one drink per day.   No smoking.   Wear sunscreen to prevent skin cancer.   See your dentist every six months for an exam and cleaning.   See your eye doctor every 1 to 2 years to screen for conditions such as glaucoma, macular degeneration and cataracts.    Personalized Prevention Plan  You are due for the preventive services outlined below.  Your care team is available to assist you in scheduling these services.  If you have already completed any of these items, please share that information with your care team to update in your medical record.  Health Maintenance   Topic Date Due    MICROALBUMIN  Never done    DIABETIC FOOT EXAM  Never done    EYE EXAM  Never done    URINALYSIS  Never done    Pneumococcal Vaccine: 65+ Years (1 - PCV) Never done    ZOSTER IMMUNIZATION (1 of 2) Never done    PHQ-9  07/06/2020    HEMOGLOBIN  12/22/2020    A1C  05/19/2021    BMP  02/19/2022    COVID-19 Vaccine (4 - Booster for Pfizer series) 02/25/2022    INFLUENZA VACCINE (1) 09/01/2022    LIPID  07/20/2023    MEDICARE ANNUAL WELLNESS VISIT  08/05/2023    FALL RISK ASSESSMENT  08/05/2023    ADVANCE CARE PLANNING  02/24/2026    DTAP/TDAP/TD IMMUNIZATION (2 - Td or Tdap) 12/21/2029    HEPATITIS C SCREENING  Completed    IPV IMMUNIZATION  Aged Out    MENINGITIS IMMUNIZATION  Aged Out         08/09/22   [facility]  Phone: [phone]  Fax: [phone]    Fax demographics, referral and office notes to [phone]    Araceli Boykin

## 2022-08-05 NOTE — PROGRESS NOTES
"SUBJECTIVE:   Scott Gottlieb Sr. is a 77 year old male who presents for Preventive Visit.      Patient has been advised of split billing requirements and indicates understanding: Yes  Are you in the first 12 months of your Medicare coverage?  No    Fatigue since the fall    Healthy Habits:     In general, how would you rate your overall health?  Fair    Frequency of exercise:  6-7 days/week    Duration of exercise:  Less than 15 minutes    Do you usually eat at least 4 servings of fruit and vegetables a day, include whole grains    & fiber and avoid regularly eating high fat or \"junk\" foods?  No    Taking medications regularly:  Yes    Medication side effects:  None    Ability to successfully perform activities of daily living:  No assistance needed    Home Safety:  No safety concerns identified    Hearing Impairment:  Difficulty following a conversation in a noisy restaurant or crowded room, feel that people are mumbling or not speaking clearly, difficulty following dialogue in the theater, difficult to understand a speaker at a public meeting or Worship service, need to ask people to speak up or repeat themselves, find that men's voices are easier to understand than woman's, difficulty understanding soft or whispered speech and difficulty understanding speech on the telephone    In the past 6 months, have you been bothered by leaking of urine?  No    In general, how would you rate your overall mental or emotional health?  Good      PHQ-2 Total Score: 0    Additional concerns today:  No    Do you feel safe in your environment? Yes  - sassy response     Have you ever done Advance Care Planning? (For example, a Health Directive, POLST, or a discussion with a medical provider or your loved ones about your wishes): No, advance care planning information given to patient to review.  Patient plans to discuss their wishes with loved ones or provider.         Fall risk  Fallen 2 or more times in the past year?: No  Any " "fall with injury in the past year?: No  click delete button to remove this line now  Cognitive Screening   1) Repeat 3 items (Apple, Max, Boat) , got it on first try, but did mishear max as \"honey\"  2) Clock draw: ABNORMAL Patient sarah a clock with hand pointing to 3 and \"1/2\" for 3:30. , Patient is also very humorous. Difficult to tell if legitimate cognitive/executive dysfunction or him being a jokester.   3) 3 item recall: Recalls 3 objects on first try  Results: 3 items recalled: COGNITIVE IMPAIRMENT LESS LIKELY          Mini-CogTM Copyright S Brody. Licensed by the author for use in [facility]; reprinted with permission (keyonna@[facility]). All rights reserved.      Do you have sleep apnea, excessive snoring or daytime drowsiness?: Some, but not more than wife    Reviewed and updated as needed this visit by clinical staff   Tobacco  Allergies  Meds                Reviewed and updated as needed this visit by Provider                   Social History     Tobacco Use     Smoking status: Never Smoker     Smokeless tobacco: Never Used   Substance Use Topics     Alcohol use: Never        Alcohol Use 8/5/2022   Prescreen: >3 drinks/day or >7 drinks/week? Not Applicable   No flowsheet data found.    Patient has had outside BMP, A1C, hemoglobin, and urine microalbumin at outside endocrinology office. WALDO form was signed at 7/20 visit. Should get results soon.     Patient remains hard of hearing. Does not want to get hearing aids, but was amenable to go to the audiologist.     Current providers sharing in care for this patient include:   Patient Care Team:  Rohini Mahoney MD as PCP - General (Family Medicine)  Baljinder Jacinto MD as Assigned Heart and Vascular Provider  Rohini Mahoney MD as Assigned PCP    The following health maintenance items are reviewed in Epic and correct as of today:  Health Maintenance Due   Topic Date Due     MICROALBUMIN  Never done     DIABETIC FOOT EXAM  Never done     " EYE EXAM  Never done     URINALYSIS  Never done     Pneumococcal Vaccine: 65+ Years (1 - PCV) Never done     ZOSTER IMMUNIZATION (1 of 2) Never done     PHQ-9  07/06/2020     HEMOGLOBIN  12/22/2020     A1C  05/19/2021     MEDICARE ANNUAL WELLNESS VISIT  02/19/2022     BMP  02/19/2022     COVID-19 Vaccine (4 - Booster for Pfizer series) 02/25/2022     BP Readings from Last 3 Encounters:   08/05/22 136/70   07/20/22 (!) 143/78   06/14/22 120/52    Wt Readings from Last 3 Encounters:   08/05/22 77.9 kg (171 lb 12.8 oz)   07/20/22 77.9 kg (171 lb 12.8 oz)   06/14/22 77.6 kg (171 lb)                  Patient Active Problem List   Diagnosis     Coronary artery disease involving native coronary artery of native heart without angina pectoris     CAD (coronary artery disease)     B12 deficiency     CRF (chronic renal failure)     HTN (hypertension)     Syncope and collapse     Type 2 diabetes mellitus (H)     Chronic kidney disease, stage 3 (H)     Ischemic cardiomyopathy     Past Surgical History:   Procedure Laterality Date     CARDIAC SURGERY         Social History     Tobacco Use     Smoking status: Never Smoker     Smokeless tobacco: Never Used   Substance Use Topics     Alcohol use: Never     Family History   Problem Relation Age of Onset     Cancer No family hx of      Diabetes No family hx of      Varicose Veins Mother      Hypertension Sister      Coronary Artery Disease Brother      Hypertension Brother          Current Outpatient Medications   Medication Sig Dispense Refill     allopurinol (ZYLOPRIM) 300 MG tablet Take 300 mg by mouth daily       atorvastatin (LIPITOR) 40 MG tablet TAKE 1 TABLET BY MOUTH EVERYDAY AT BEDTIME 90 tablet 1     clopidogrel (PLAVIX) 75 MG tablet Take 75 mg by mouth daily       cyanocobalamin (VITAMIN B-12) 1000 MCG tablet Take 1,000 mcg by mouth daily       ergocalciferol (ERGOCALCIFEROL) 1.25 MG (45303 UT) capsule Take 50,000 Units by mouth       famotidine (PEPCID) 10 MG tablet Take 1  tablet (10 mg) by mouth 2 times daily as needed (Patient not taking: Reported on 6/14/2022) 30 tablet 0     gabapentin (NEURONTIN) 100 MG capsule Take 100 mg by mouth 3 times daily as needed       hydrocortisone (CORTAID) 0.5 % external ointment Apply topically 2 times daily 56 g 1     hydrOXYzine (ATARAX) 25 MG tablet TAKE 1-2 TABLETS (25-50 MG) BY MOUTH NIGHTLY AS NEEDED FOR ANXIETY OR OTHER (SLEEP). NOT COVERED 180 tablet 1     KERENDIA 10 MG TABS        ketoconazole (NIZORAL) 2 % external shampoo Apply topically daily as needed for itching or irritation 120 mL 1     latanoprost (XALATAN) 0.005 % ophthalmic solution 1 drop (Patient not taking: Reported on 6/14/2022)       latanoprost (XALATAN) 0.005 % ophthalmic solution Apply 1 drop to eye At Bedtime       levothyroxine (SYNTHROID/LEVOTHROID) 50 MCG tablet Take 50 mcg by mouth daily       lidocaine (XYLOCAINE) 5 % external ointment Apply topically as needed for moderate pain 50 g 0     lisinopril (PRINIVIL/ZESTRIL) 5 MG tablet Take 5 mg by mouth daily       metFORMIN (GLUCOPHAGE-XR) 500 MG 24 hr tablet Take 2,000 mg by mouth daily (with breakfast)       ONETOUCH ULTRA test strip USE 1 (ONE) STRIP IN VITRO DAILY       pregabalin (LYRICA) 300 MG capsule Take 300 mg by mouth 2 times daily       timolol maleate (TIMOPTIC) 0.25 % ophthalmic solution Apply 1 drop to eye daily (Patient not taking: Reported on 6/14/2022)       timolol maleate (TIMOPTIC) 0.5 % ophthalmic solution Place 1 drop into both eyes daily       triamterene-HCTZ (DYAZIDE) 37.5-25 MG capsule Take 1 capsule by mouth every morning       Allergies   Allergen Reactions     Aspirin Hives     Compazine [Prochlorperazine] Hives     Sulfa Drugs Hives     Nepafenac Anxiety and Hives     Pneumonia Vaccine: 65 and older with no predisposing conditions= PCV 20        Review of Systems   Constitutional: Negative for chills and fever.   HENT: Positive for ear pain and hearing loss. Negative for congestion and  "sore throat.    Eyes: Positive for visual disturbance. Negative for pain.   Respiratory: Positive for shortness of breath. Negative for cough.    Cardiovascular: Negative for chest pain, palpitations and peripheral edema.   Gastrointestinal: Negative for abdominal pain, constipation, diarrhea, heartburn, hematochezia and nausea.   Genitourinary: Positive for impotence. Negative for dysuria, frequency, genital sores, hematuria, penile discharge and urgency.   Musculoskeletal: Positive for arthralgias. Negative for joint swelling and myalgias.   Skin: Positive for rash.   Neurological: Positive for dizziness and headaches. Negative for weakness and paresthesias.   Psychiatric/Behavioral: Negative for mood changes. The patient is not nervous/anxious.    Shooting pain down L leg       OBJECTIVE:   /70   Pulse 66   Temp 97.5  F (36.4  C) (Oral)   Resp 16   Ht 1.62 m (5' 3.78\")   Wt 77.9 kg (171 lb 12.8 oz)   SpO2 99%   BMI 29.69 kg/m   Estimated body mass index is 29.69 kg/m  as calculated from the following:    Height as of this encounter: 1.62 m (5' 3.78\").    Weight as of this encounter: 77.9 kg (171 lb 12.8 oz).  Physical Exam  GENERAL: healthy, alert and no distress  EYES: Eyes grossly normal to inspection, arcus senilis present, PERRL and conjunctivae and sclerae normal  HENT: ear canals and TM's normal but matte color rather than glossy, nose and mouth without ulcers or lesions  NECK: no adenopathy, no asymmetry, masses, or scars and thyroid normal to palpation  RESP: lungs clear to auscultation - no rales, rhonchi or wheezes  CV: regular rate and rhythm, normal S1 S2, no S3 or S4, no murmur, click or rub, no peripheral edema and peripheral pulses strong  ABDOMEN: soft, nontender, no hepatosplenomegaly, no masses and bowel sounds normal  MS: no gross musculoskeletal defects noted, no edema, strength testing 5/5 upper and lower extremities  SKIN: same rash on bilateral R ear lobes from last visit " "(7/20)  NEURO: Normal strength and tone, mentation intact and speech normal, hard of hearing  PSYCH: mentation appears normal, affect normal/bright  LYMPH: no cervical, supraclavicular, axillary, or inguinal adenopathy  Diabetic foot exam: normal DP and PT pulses, no trophic changes or ulcerative lesions, normal sensory exam and normal monofilament exam    Diagnostic Test Results:  Labs reviewed in Epic    ASSESSMENT / PLAN:       ICD-10-CM    1. Encounter for Medicare annual wellness exam  Z00.00 Adult Audiology  Referral   2. Presbycusis of both ears  H91.13 Adult Audiology  Referral       Patient has been advised of split billing requirements and indicates understanding: Yes    COUNSELING:  Reviewed preventive health counseling, as reflected in patient instructions  Special attention given to:       Regular exercise       Healthy diet/nutrition       Hearing screening       Colon cancer screening    Estimated body mass index is 29.69 kg/m  as calculated from the following:    Height as of this encounter: 1.62 m (5' 3.78\").    Weight as of this encounter: 77.9 kg (171 lb 12.8 oz).    Weight management plan: Patient is >65 and having low energy. Focus will be on increasing some exercise capacity.     He reports that he has never smoked. He has never used smokeless tobacco.      Appropriate preventive services were discussed with this patient, including applicable screening as appropriate for cardiovascular disease, diabetes, osteopenia/osteoporosis, and glaucoma.  As appropriate for age/gender, discussed screening for colorectal cancer, prostate cancer, breast cancer, and cervical cancer. Checklist reviewing preventive services available has been given to the patient.    Reviewed patients plan of care and provided an AVS. The Basic Care Plan (routine screening as documented in Health Maintenance) for Scott meets the Care Plan requirement. This Care Plan has been established and reviewed with " the Patient and spouse and daughter.    Counseling Resources:  ATP IV Guidelines  Pooled Cohorts Equation Calculator  Breast Cancer Risk Calculator  Breast Cancer: Medication to Reduce Risk  FRAX Risk Assessment  ICSI Preventive Guidelines  Dietary Guidelines for Americans, 2010  [employer] MyPlate  ASA Prophylaxis  Lung CA Screening    Rohini Mahoney MD  [facility]    Identified Health Risks:

## 2022-08-05 NOTE — PROGRESS NOTES
Preceptor Attestation:    I discussed the patient with the resident and evaluated the patient in person. I have verified the content of the note, which accurately reflects my assessment of the patient and the plan of care.   Supervising Physician:  Otto May MD.

## 2022-08-09 ENCOUNTER — MYC MEDICAL ADVICE (OUTPATIENT)
Dept: FAMILY MEDICINE | Facility: CLINIC | Age: 77
End: 2022-08-09

## 2022-09-18 ENCOUNTER — HEALTH MAINTENANCE LETTER (OUTPATIENT)
Age: 77
End: 2022-09-18

## 2022-10-12 ENCOUNTER — OFFICE VISIT (OUTPATIENT)
Dept: FAMILY MEDICINE | Facility: CLINIC | Age: 77
End: 2022-10-12
Payer: MEDICARE

## 2022-10-12 VITALS
SYSTOLIC BLOOD PRESSURE: 113 MMHG | TEMPERATURE: 97 F | HEART RATE: 65 BPM | RESPIRATION RATE: 20 BRPM | OXYGEN SATURATION: 100 % | BODY MASS INDEX: 29.37 KG/M2 | HEIGHT: 64 IN | DIASTOLIC BLOOD PRESSURE: 65 MMHG | WEIGHT: 172 LBS

## 2022-10-12 DIAGNOSIS — H60.501 ACUTE OTITIS EXTERNA OF RIGHT EAR, UNSPECIFIED TYPE: ICD-10-CM

## 2022-10-12 DIAGNOSIS — Z23 HIGH PRIORITY FOR 2019-NCOV VACCINE: Primary | ICD-10-CM

## 2022-10-12 DIAGNOSIS — Z23 NEED FOR PROPHYLACTIC VACCINATION AND INOCULATION AGAINST INFLUENZA: ICD-10-CM

## 2022-10-12 PROCEDURE — 90662 IIV NO PRSV INCREASED AG IM: CPT

## 2022-10-12 PROCEDURE — G0008 ADMIN INFLUENZA VIRUS VAC: HCPCS

## 2022-10-12 PROCEDURE — 0124A COVID-19,PF,PFIZER BOOSTER BIVALENT: CPT

## 2022-10-12 PROCEDURE — 91312 COVID-19,PF,PFIZER BOOSTER BIVALENT: CPT

## 2022-10-12 PROCEDURE — 99213 OFFICE O/P EST LOW 20 MIN: CPT | Mod: 25

## 2022-10-12 ASSESSMENT — ACTIVITIES OF DAILY LIVING (ADL)
HEARING_DIFFICULTY_OR_DEAF: NO
DRESSING/BATHING_DIFFICULTY: NO
WALKING_OR_CLIMBING_STAIRS_DIFFICULTY: NO
CHANGE_IN_FUNCTIONAL_STATUS_SINCE_ONSET_OF_CURRENT_ILLNESS/INJURY: NO
DIFFICULTY_EATING/SWALLOWING: NO
FALL_HISTORY_WITHIN_LAST_SIX_MONTHS: NO
VISION_MANAGEMENT: READING
DOING_ERRANDS_INDEPENDENTLY_DIFFICULTY: NO
WEAR_GLASSES_OR_BLIND: YES
TOILETING_ISSUES: NO
CONCENTRATING,_REMEMBERING_OR_MAKING_DECISIONS_DIFFICULTY: NO
DIFFICULTY_COMMUNICATING: NO

## 2022-10-12 NOTE — PROGRESS NOTES
"  Assessment & Plan      Acute otitis externa of right ear, unspecified type  R ear pain for 1 week following cleaning ear with Q-tip and hydrogen peroxide. Pain with insertion of otoscope and noted erythema and irritation of ear canal but normal tympanum.   - ciprofloxacin-hydrocortisone (CIPRO HC OTIC) 0.2-1 % otic suspension; Place 3 drops into the right ear 2 times daily for 7 days    Need for prophylactic vaccination and inoculation against influenza  High priority for 2019-nCoV vaccine  - INFLUENZA, QUAD, HIGH DOSE, PF, 65YR + (FLUZONE HD)  - COVID-19,PF,PFIZER BOOSTER BIVALENT 12+Yrs        Diagnosis or treatment significantly limited by social determinants of health - hard of hearing  Prescription drug management  25 minutes spent on the date of the encounter doing chart review, history and exam, documentation and further activities per the note           Return if symptoms worsen or fail to improve.    Rohini Mahoney MD  [facility] NEYMAR Chavez is a 77 year old accompanied by his spouse, presenting for the following health issues:  Ear Problem (Ear pain about a weeks now), Imm/Inj (Flu Shot), and Imm/Inj (COVID-19 VACCINE)      HPI     Possible ear infection    R ear, started subtly 1 week ago, intermittent pain, occasionally radiates to jaw when chewing, no fever. Pain first noticed when he was cleaning his ears out with a Q-tip and hydrogen perioxide. Pain mainly worsened by insertion of objects into ear. No worsening of baseline hearing.     November 3rd ear doctor appointment coming up for hearing aid fitting.     Baseline runny nose since fall over 2 year ago, but no other cold symptoms.       Review of Systems   Constitutional, HEENT, cardiovascular, pulmonary, gi and gu systems are negative, except as otherwise noted.      Objective    /65   Pulse 65   Temp 97  F (36.1  C) (Oral)   Resp 20   Ht 1.626 m (5' 4\")   Wt 78 kg (172 lb)   SpO2 100%   BMI " 29.52 kg/m    Body mass index is 29.52 kg/m .  Physical Exam   GENERAL: healthy, alert and no distress  HENT: bilateral TMs normal, R ear tender to insertion of otoscope, nontender to tugging on ear, erythema noted on interior ear canal, nose and mouth without ulcers or lesions, noted various fillings from previous dental work, no new dental carries, hearing is baseline poor   RESP: lungs clear to auscultation - no rales, rhonchi or wheezes  CV: regular rate and rhythm, normal S1 S2, no S3 or S4, no murmur, click or rub, no peripheral edema and peripheral pulses strong  PSYCH: mentation appears normal, affect normal/bright, sassy as per baseline        ----- Service Performed and Documented by Resident or Fellow ------

## 2022-11-03 ENCOUNTER — OFFICE VISIT (OUTPATIENT)
Dept: OTOLARYNGOLOGY | Facility: CLINIC | Age: 77
End: 2022-11-03
Payer: MEDICARE

## 2022-11-03 ENCOUNTER — OFFICE VISIT (OUTPATIENT)
Dept: AUDIOLOGY | Facility: CLINIC | Age: 77
End: 2022-11-03
Payer: MEDICARE

## 2022-11-03 DIAGNOSIS — H90.3 SENSORINEURAL HEARING LOSS (SNHL) OF BOTH EARS: Primary | ICD-10-CM

## 2022-11-03 DIAGNOSIS — H90.3 SENSORINEURAL HEARING LOSS, ASYMMETRICAL: Primary | ICD-10-CM

## 2022-11-03 PROCEDURE — 99203 OFFICE O/P NEW LOW 30 MIN: CPT | Performed by: OTOLARYNGOLOGY

## 2022-11-03 PROCEDURE — 92550 TYMPANOMETRY & REFLEX THRESH: CPT | Performed by: AUDIOLOGIST

## 2022-11-03 PROCEDURE — 92557 COMPREHENSIVE HEARING TEST: CPT | Performed by: AUDIOLOGIST

## 2022-11-03 RX ORDER — TRIAMTERENE/HYDROCHLOROTHIAZID 37.5-25 MG
1 TABLET ORAL DAILY
COMMUNITY
Start: 2022-09-03

## 2022-11-03 RX ORDER — METHYL SALICYLATE/MENTHOL 30 %-10 %
CREAM (GRAM) TOPICAL
COMMUNITY
Start: 2022-07-20

## 2022-11-03 NOTE — PROGRESS NOTES
HPI: This patient is a 78yo F who presents for evaluation of hearing at the request of Dr. Mahoney. There has been a gradual loss of hearing over the past few years in both ears. Denies otalgia, otorrhea, vertigo, and other major medical issues. Has been around moderate industrial noise and has no relevant family history. He has tried HAs in the past on at least 2 occasions and never really was satisfied with them. He does deal with pre-syncopal lightheadedness, but no vertigo or oscillopsia.    Past medical history, surgical history, social history, family history, medications, and allergies have been reviewed with the patient and are documented above.    Review of Systems: a 10-system review was performed. Pertinent positives are noted in the HPI and on a separate scanned document in the chart.    PHYSICAL EXAMINATION:  GEN: no acute distress, normocephalic  EYES: extraocular movements are intact, pupils are equal and round. Sclera clear.   EARS: auricles are normally formed. The external auditory canals are clear with minimal to no cerumen. Tympanic membranes are intact bilaterally with no signs of infection, effusion, retractions, or perforations.  NOSE: anterior nares are patent. There are no masses or lesions. The septum is non-obstructing.  OC/OP: clear, dentition is in good repair. The tongue and palate are fully mobile and symmetric. No masses or lesions.  NECK: soft and supple. No lymphadenopathy or masses. Airway is midline.  NEURO: CN VII and XII symmetric. alert and oriented. No spontaneous nystagmus. Gait is normal.  PULM: breathing comfortably on room air, normal chest expansion with respiration  CARDS: no cyanosis or clubbing, normal carotid pulses    AUDIOGRAM: mild to profound SNHL bilaterally. WRS 60R/32L, type A tymps    MEDICAL DECISION-MAKING: This patient is a 78yo M with sensorineural hearing loss. Discussed hearing protection. Medically clear for hearing aids should the patient desire them.  If his results continue to be lackluster with HAs, can consider CI eval. The dizziness he describes is non-otologic.

## 2022-11-03 NOTE — LETTER
11/3/2022         RE: Scott Gottlieb Sr.  [address] [place] [address]        Dear Colleague,    Thank you for referring your patient, Scott Gottlieb Sr., to the [facility]. Please see a copy of my visit note below.    HPI: This patient is a 76yo F who presents for evaluation of hearing at the request of Dr. Mahoney. There has been a gradual loss of hearing over the past few years in both ears. Denies otalgia, otorrhea, vertigo, and other major medical issues. Has been around moderate industrial noise and has no relevant family history. He has tried HAs in the past on at least 2 occasions and never really was satisfied with them. He does deal with pre-syncopal lightheadedness, but no vertigo or oscillopsia.    Past medical history, surgical history, social history, family history, medications, and allergies have been reviewed with the patient and are documented above.    Review of Systems: a 10-system review was performed. Pertinent positives are noted in the HPI and on a separate scanned document in the chart.    PHYSICAL EXAMINATION:  GEN: no acute distress, normocephalic  EYES: extraocular movements are intact, pupils are equal and round. Sclera clear.   EARS: auricles are normally formed. The external auditory canals are clear with minimal to no cerumen. Tympanic membranes are intact bilaterally with no signs of infection, effusion, retractions, or perforations.  NOSE: anterior nares are patent. There are no masses or lesions. The septum is non-obstructing.  OC/OP: clear, dentition is in good repair. The tongue and palate are fully mobile and symmetric. No masses or lesions.  NECK: soft and supple. No lymphadenopathy or masses. Airway is midline.  NEURO: CN VII and XII symmetric. alert and oriented. No spontaneous nystagmus. Gait is normal.  PULM: breathing comfortably on room air, normal chest expansion with respiration  CARDS: no cyanosis or clubbing, normal  carotid pulses    AUDIOGRAM: mild to profound SNHL bilaterally. WRS 60R/32L, type A tymps    MEDICAL DECISION-MAKING: This patient is a 78yo M with sensorineural hearing loss. Discussed hearing protection. Medically clear for hearing aids should the patient desire them. If his results continue to be lackluster with HAs, can consider CI eval. The dizziness he describes is non-otologic.         Again, thank you for allowing me to participate in the care of your patient.        Sincerely,        Janet Mary MD

## 2022-11-03 NOTE — PROGRESS NOTES
AUDIOLOGY REPORT    SUMMARY: Audiology visit completed. See audiogram for results. Abuse screening not completed due to same day appt with ENT clinic, where this is addressed.      RECOMMENDATIONS: Follow-up with ENT.      Michael Bailey, [facility]  [place] Licensed Audiologist [id number]

## 2023-01-08 DIAGNOSIS — I25.10 CORONARY ARTERY DISEASE INVOLVING NATIVE CORONARY ARTERY OF NATIVE HEART WITHOUT ANGINA PECTORIS: ICD-10-CM

## 2023-01-10 RX ORDER — ATORVASTATIN CALCIUM 40 MG/1
TABLET, FILM COATED ORAL
Qty: 90 TABLET | Refills: 1 | Status: SHIPPED | OUTPATIENT
Start: 2023-01-10 | End: 2023-07-24

## 2023-01-29 ENCOUNTER — HEALTH MAINTENANCE LETTER (OUTPATIENT)
Age: 78
End: 2023-01-29

## 2023-05-07 ENCOUNTER — HEALTH MAINTENANCE LETTER (OUTPATIENT)
Age: 78
End: 2023-05-07

## 2023-07-21 DIAGNOSIS — I25.10 CORONARY ARTERY DISEASE INVOLVING NATIVE CORONARY ARTERY OF NATIVE HEART WITHOUT ANGINA PECTORIS: ICD-10-CM

## 2023-07-24 RX ORDER — ATORVASTATIN CALCIUM 40 MG/1
TABLET, FILM COATED ORAL
Qty: 90 TABLET | Refills: 1 | Status: SHIPPED | OUTPATIENT
Start: 2023-07-24 | End: 2024-01-30

## 2023-08-29 NOTE — PROGRESS NOTES
"  Assessment & Plan     Skin lesion of scalp  Had rapid growth of pruritic lesion on forehead over 1 week. Associated itchy/swollen eyes. Lesion itches, is rapidly growing and bleeding/scabing. Most likely is poor-healing bug bite, but due to sun-exposed area and rimmed appearance of lesion, want to rule out possible rapidly growing basal cell carcinoma. Urgent derm referral for Mohs biopsy.   - Adult Dermatology Referral; Future  - mupirocin (BACTROBAN) 2 % external ointment; Apply topically 3 times daily    Itching  Patient reporting pruritus of area along with itchy eyes.  Again, this leads me to think this may be more of an allergic reaction to a bug bite, the patient has been itching the area profoundly and has not given the area time to heal.  Providing cetirizine for itch control as I want to minimize any steroids to the area before dermatology is able to biopsy the area.  - cetirizine (ZYRTEC) 10 MG tablet; Take 1 tablet (10 mg) by mouth daily as needed for allergies (Itching)  -Patient calls with profound itching that is not improving with this, consider very low-dose steroid to the area    Diagnosis or treatment significantly limited by social determinants of health - hard of hearing, not driving, not familiar with other parts of the [facility]  35 minutes spent by me on the date of the encounter doing chart review, history and exam, documentation and further activities per the note       BMI:   Estimated body mass index is 28.8 kg/m  as calculated from the following:    Height as of 10/12/22: 1.626 m (5' 4\").    Weight as of this encounter: 76.1 kg (167 lb 12.8 oz).   Weight management plan: Discussed healthy diet and exercise guidelines    MEDICATIONS:   Orders Placed This Encounter   Medications    cetirizine (ZYRTEC) 10 MG tablet     Sig: Take 1 tablet (10 mg) by mouth daily as needed for allergies (Itching)     Dispense:  90 tablet     Refill:  0    mupirocin (BACTROBAN) 2 % external ointment     " "Sig: Apply topically 3 times daily     Dispense:  30 g     Refill:  1          - Continue other medications without change  CONSULTATION/REFERRAL to Dermatology for Mohs biopsy    Return for Routine preventive.    Rohini Mahoney MD  PGY3 Family Medicine Resident  [facility] NEYMAR Chavez is a 78 year old, presenting for the following health issues:  Pain (Pain from forehead - scab visible on forehead and this occurred a week ago - painful and itchy. Came outta no where)        8/30/2023     8:04 AM   Additional Questions   Roomed by TOAN Pisano MA   Accompanied by Self       HPI   Possible bug bite on head    Patient reports that starting 1 week ago he had a small, profoundly itchy lesion on his head that has since grown.  He reports that he has been profoundly itchy and he has a very difficult time not itching it.  Has grown into a large scab that continues to itch, has scabbed over multiple times and bleeds.  Seems to be growing rather rapidly.  Patient also reports associated itchy/puffy eyes, but no other lesions on the head, no other small bug bites, no one else in the family with similar rashes, no recent sunburns to the area.    [place] the [place], did not have any trips into the deep woods or other bug bites that he is can recall prior.  No change to soaps, lotions, shampoos (\"I only use head and shoulders\").     Personal history of skin cancer, though lots of sun exposure throughout life.    Review of Systems   Constitutional, HEENT, cardiovascular, pulmonary, gi and gu systems are negative, except as otherwise noted.      Objective    /64   Pulse 62   Temp 97.5  F (36.4  C) (Oral)   Resp 16   Wt 76.1 kg (167 lb 12.8 oz)   SpO2 98%   BMI 28.80 kg/m    Body mass index is 28.8 kg/m .  Physical Exam   GENERAL: healthy, alert and mild distress (itching very bothersome)  EYES: Eyes grossly normal to inspection, PERRL and conjunctivae and sclerae " "normal  RESP: lungs clear to auscultation - no rales, rhonchi or wheezes  CV: regular rate and rhythm, normal S1 S2, no S3 or S4, no murmur, click or rub, no peripheral edema and peripheral pulses strong  MS: no gross musculoskeletal defects noted, no edema  SKIN: [id number] ovoid, pruritic lesion on forehead, mildly erythematous and raised bases, (see photos)  PSYCH: mentation appears normal, affect irritable, mood \"okay\"        ----- Service Performed and Documented by Resident or Fellow ------              "

## 2023-08-30 ENCOUNTER — OFFICE VISIT (OUTPATIENT)
Dept: FAMILY MEDICINE | Facility: CLINIC | Age: 78
End: 2023-08-30
Payer: MEDICARE

## 2023-08-30 ENCOUNTER — TELEPHONE (OUTPATIENT)
Dept: DERMATOLOGY | Facility: CLINIC | Age: 78
End: 2023-08-30

## 2023-08-30 VITALS
HEART RATE: 62 BPM | SYSTOLIC BLOOD PRESSURE: 101 MMHG | OXYGEN SATURATION: 98 % | DIASTOLIC BLOOD PRESSURE: 64 MMHG | RESPIRATION RATE: 16 BRPM | BODY MASS INDEX: 28.8 KG/M2 | TEMPERATURE: 97.5 F | WEIGHT: 167.8 LBS

## 2023-08-30 DIAGNOSIS — L98.9 SKIN LESION OF SCALP: Primary | ICD-10-CM

## 2023-08-30 DIAGNOSIS — L29.9 ITCHING: ICD-10-CM

## 2023-08-30 PROCEDURE — 99214 OFFICE O/P EST MOD 30 MIN: CPT | Mod: GC

## 2023-08-30 RX ORDER — CETIRIZINE HYDROCHLORIDE 10 MG/1
10 TABLET ORAL DAILY PRN
Qty: 90 TABLET | Refills: 0 | Status: SHIPPED | OUTPATIENT
Start: 2023-08-30 | End: 2023-11-21

## 2023-08-30 RX ORDER — MUPIROCIN 20 MG/G
OINTMENT TOPICAL 3 TIMES DAILY
Qty: 30 G | Refills: 1 | Status: SHIPPED | OUTPATIENT
Start: 2023-08-30

## 2023-08-30 NOTE — PATIENT INSTRUCTIONS
We will be scheduling you for a biopsy appointment with dermatology.  Until then, to manage the itch will be giving you an oral medication called cetirizine.  Take this once daily and that should help the itching in your eyes as well as your scalp.  Apply the Bactroban ointment to your sore once daily to avoid getting an infection.    In the meanwhile, try to keep the area covered up and avoid sun exposure where possible.            08/31/23   [address], [facility] [address]  [place] [address]  Phone: [phone]  Fax: [phone]    Referral, demographics, and office visit faxed to [phone]. They will contact pt for an appt.     Araceli Boykin

## 2023-08-30 NOTE — TELEPHONE ENCOUNTER
This encounter is being sent to inform the clinic that this patient has a referral from Dr Rohini Chu for the diagnoses of Rapid-growing itchy lesion on forehead, Mohs desired for cancer rule out  and has requested that this patient be seen within 1-2 weeks.  Based on the availability of our provider(s), we are unable to accommodate this request.    Were all sites offered this patient?  Yes    Does scheduling algorithm request to schedule next available?  Patient has been scheduled for the first available opening with Dr Santino Ashton on 3/28/24.  We have informed the patient that the clinic will review their referral and reach out if a sooner appointment is medically necessary.

## 2023-08-30 NOTE — TELEPHONE ENCOUNTER
Called and spoke with pt and scheduled appt.    Thank you,  Krista JOHNSON RN  Dermatology   [phone]

## 2023-09-06 ENCOUNTER — OFFICE VISIT (OUTPATIENT)
Dept: DERMATOLOGY | Facility: CLINIC | Age: 78
End: 2023-09-06
Payer: MEDICARE

## 2023-09-06 DIAGNOSIS — D48.5 NEOPLASM OF UNCERTAIN BEHAVIOR OF SKIN: ICD-10-CM

## 2023-09-06 DIAGNOSIS — L98.9 SKIN LESION OF SCALP: ICD-10-CM

## 2023-09-06 PROCEDURE — 11104 PUNCH BX SKIN SINGLE LESION: CPT | Performed by: STUDENT IN AN ORGANIZED HEALTH CARE EDUCATION/TRAINING PROGRAM

## 2023-09-06 PROCEDURE — 88312 SPECIAL STAINS GROUP 1: CPT | Performed by: DERMATOLOGY

## 2023-09-06 PROCEDURE — 88341 IMHCHEM/IMCYTCHM EA ADD ANTB: CPT | Performed by: DERMATOLOGY

## 2023-09-06 PROCEDURE — 88342 IMHCHEM/IMCYTCHM 1ST ANTB: CPT | Performed by: DERMATOLOGY

## 2023-09-06 PROCEDURE — 88305 TISSUE EXAM BY PATHOLOGIST: CPT | Mod: XE | Performed by: DERMATOLOGY

## 2023-09-06 NOTE — LETTER
9/6/2023         RE: Scott Gottlieb Sr.  [address] [place] [address]        Dear Colleague,    Thank you for referring your patient, Scott Gottlieb Sr., to the [facility]. Please see a copy of my visit note below.    [facility] Dermatology Note    Encounter Date: Sep 6, 2023    Dermatology Problem List:    ______________________________________    Impression/Plan:  Scott was seen today for derm problem.    Diagnoses and all orders for this visit:    Skin lesion of scalp  -     Adult Dermatology Referral    -Pink to violaceous plaque with central crust overlying what is likely necrosis  - Patient unable to Really describe how would like when it began but states that that there may have been some blisters which along with the circular scalloped borders could suggest a herpes virus origin, with this type of pink plaque appearance possibly a pseudo lymphomatous form  - Other possibilities include ALCL with possible regression versus some sort of exuberant arthropod bite  - Less likely considerations would be things like anthrax, no exposures       After discussion of benefits and risks including but not limited to bleeding, infection, scar, incomplete removal, recurrence, and non-diagnostic biopsy, written consent and photographs were obtained. The area was cleaned with isopropyl alcohol. 2 mL of 1% lidocaine with epinephrine was injected to obtain adequate anesthesia of the lesion on the forehead . A 4 mm punch biopsy was performed. 4-0 Prolene sutures were utilized to approximate the epidermal edges. White petrolatum ointment and a bandage was applied to the wound. Explicit verbal and written wound care instructions were provided. The patient left the dermatology clinic in good condition.    Follow-up after results .       Staff Involved:  Staff Only    Jason Araiza MD   of Dermatology  Department of Dermatology  [facility]  [employer]      CC:   Chief Complaint   Patient presents with     Derm Problem     Lesion on the forehead- 3 weeks- nonhealing        History of Present Illness:  Mr. Scott Gottlieb Sr. is a 78 year old male who presents as a new patient.    Spot on forehead present for 3 weeks. Saw PCP 08/30. Was given zyrtec and mupirocin.  Unclear exactly how it started, ?may have been blisters, he's not sure     Labs:      Physical exam:  Vitals: There were no vitals taken for this visit.  GEN: well developed, well-nourished, in no acute distress, in a pleasant mood.     SKIN: Mancilla phototype 1  - Focused examination of the forehead was performed.  - pink plaque w/ central plaque and necrosis   - No other lesions of concern on areas examined.     Past Medical History:   Past Medical History:   Diagnosis Date     Anxiety      Coronary artery disease      Coronary artery disease      Diabetes mellitus (H)      Diabetes mellitus, type 2 (H)      Hyperlipidemia      Hypertension      Past Surgical History:   Procedure Laterality Date     CARDIAC SURGERY         Social History:   reports that he has never smoked. He has never used smokeless tobacco. He reports that he does not drink alcohol and does not use drugs.    Family History:  Family History   Problem Relation Age of Onset     Cancer No family hx of      Diabetes No family hx of      Varicose Veins Mother      Hypertension Sister      Coronary Artery Disease Brother      Hypertension Brother        Medications:  Current Outpatient Medications   Medication Sig Dispense Refill     allopurinol (ZYLOPRIM) 300 MG tablet Take 300 mg by mouth daily       atorvastatin (LIPITOR) 40 MG tablet TAKE 1 TABLET BY MOUTH EVERYDAY AT BEDTIME 90 tablet 1     cetirizine (ZYRTEC) 10 MG tablet Take 1 tablet (10 mg) by mouth daily as needed for allergies (Itching) 90 tablet 0     clopidogrel (PLAVIX) 75 MG tablet Take 75 mg by mouth daily       [employer] HYDROCORTISONE ANTI-ITCH  0.5 % external cream APPLY TO AFFECTED AREA TWICE A DAY**OINTMENT NOT AVAILABLE       cyanocobalamin (VITAMIN B-12) 1000 MCG tablet Take 1,000 mcg by mouth daily       ergocalciferol (ERGOCALCIFEROL) 1.25 MG (97829 UT) capsule Take 50,000 Units by mouth       famotidine (PEPCID) 10 MG tablet Take 1 tablet (10 mg) by mouth 2 times daily as needed 30 tablet 0     gabapentin (NEURONTIN) 100 MG capsule Take 100 mg by mouth 3 times daily as needed       hydrOXYzine (ATARAX) 25 MG tablet TAKE 1-2 TABLETS (25-50 MG) BY MOUTH NIGHTLY AS NEEDED FOR ANXIETY OR OTHER (SLEEP). NOT COVERED 180 tablet 1     KERENDIA 10 MG TABS        latanoprost (XALATAN) 0.005 % ophthalmic solution 1 drop       latanoprost (XALATAN) 0.005 % ophthalmic solution Apply 1 drop to eye At Bedtime       levothyroxine (SYNTHROID/LEVOTHROID) 50 MCG tablet Take 50 mcg by mouth daily       lidocaine (XYLOCAINE) 5 % external ointment Apply topically as needed for moderate pain 50 g 0     lisinopril (PRINIVIL/ZESTRIL) 5 MG tablet Take 5 mg by mouth daily       metFORMIN (GLUCOPHAGE-XR) 500 MG 24 hr tablet Take 2,000 mg by mouth daily (with breakfast)       mupirocin (BACTROBAN) 2 % external ointment Apply topically 3 times daily 30 g 1     ONETOUCH ULTRA test strip USE 1 (ONE) STRIP IN VITRO DAILY       pregabalin (LYRICA) 300 MG capsule Take 300 mg by mouth 2 times daily       timolol maleate (TIMOPTIC) 0.25 % ophthalmic solution Apply 1 drop to eye daily       timolol maleate (TIMOPTIC) 0.5 % ophthalmic solution Place 1 drop into both eyes daily       triamterene-HCTZ (DYAZIDE) 37.5-25 MG capsule Take 1 capsule by mouth every morning       triamterene-HCTZ (MAXZIDE-25) 37.5-25 MG tablet Take 1 tablet by mouth daily       Allergies   Allergen Reactions     Aspirin Hives     Compazine [Prochlorperazine] Hives     Sulfa Antibiotics Hives     Nepafenac Anxiety and Hives                 Again, thank you for allowing me to participate in the care of your  patient.        Sincerely,        Jason Araiza MD

## 2023-09-06 NOTE — PATIENT INSTRUCTIONS
Wound Care After a Biopsy    What is a skin biopsy?  A skin biopsy allows the doctor to examine a very small piece of tissue under the microscope to determine the diagnosis and the best treatment for the skin condition. A local anesthetic (numbing medicine) is injected with a very small needle into the skin area to be tested. A small piece of skin is taken from the area. Sometimes a suture (stitch) is used.     What are the risks of a skin biopsy?  I will experience scar, bleeding, swelling, pain, crusting and redness. I may experience incomplete removal or recurrence. Risks of this procedure are excessive bleeding, bruising, infection, nerve damage, numbness, thick (hypertrophic or keloidal) scar and non-diagnostic biopsy.    How should I care for my wound for the first 24 hours?  Keep the wound dry and covered for 24 hours  If it bleeds, hold direct pressure on the area for 15 minutes. If bleeding does not stop, call us or go to the emergency room  Avoid strenuous exercise the first 1-2 days or as your doctor instructs you    How should I care for the wound after 24 hours?  After 24 hours, remove the bandage  You may bathe or shower as normal  If you had a scalp biopsy, you can shampoo as usual and can use shower water to clean the biopsy site daily  Clean the wound once a day with gentle soap and water  Do not scrub, be gentle  Apply white petroleum/Vaseline after cleaning the wound with a cotton swab or a clean finger, and keep the site covered with a Bandaid /bandage. Bandages are not necessary with a scalp biopsy  If you are unable to cover the site with a Bandaid /bandage, re-apply ointment 2-3 times a day to keep the site moist. Moisture will help with healing  Avoid strenuous activity for first 1-2 days  Avoid lakes, rivers, pools, and oceans until the stitches are removed or the site is healed    How do I clean my wound?  Wash hands thoroughly with soap or use hand  before all wound care  Clean  the wound with gentle soap and water  Apply white petroleum/Vaseline  to wound after it is clean  Replace the Bandaid /bandage to keep the wound covered for the first few days or as instructed by your doctor  If you had a scalp biopsy, warm shower water to the area on a daily basis should suffice    What should I use to clean my wound?   Cotton-tipped applicators (Qtips )  White petroleum jelly (Vaseline ). Use a clean new container and use Q-tips to apply.  Bandaids  as needed  Gentle soap     How should I care for my wound long term?  Do not get your wound dirty  Keep up with wound care for one week or until the area is healed.  If you have stitches, stitches need to be removed in day 10 days. You may return to our clinic for this or you may have it done locally at your doctor s office.  A small scab will form and fall off by itself when the area is completely healed. The area will be red and will become pink in color as it heals. Sun protection is very important for how your scar will turn out. Sunscreen with an SPF 30 or greater is recommended once the area is healed.  You should have some soreness but it should be mild and slowly go away over several days. Talk to your doctor about using tylenol for pain,    When should I call my doctor?  If you have increased:   Pain or swelling  Pus or drainage (clear or slightly yellow drainage is ok)  Temperature over 100F  Spreading redness or warmth around wound    When will I hear about my results?  The biopsy results can take 2 weeks to come back.  Your results will automatically release to [email or web address] before your provider has even reviewed them.  The clinic will call you with the results, send you a [email or web address] message, or have you schedule a follow-up clinic or phone time to discuss the results.  Contact our clinics if you do not hear from us in 2 weeks.    Who should I call with questions?  [facility]: [phone]  [facility]  [facility]: [phone]  For urgent needs outside of business hours call the [facility] at [phone] and ask for the dermatology resident on call

## 2023-09-06 NOTE — PROGRESS NOTES
[facility] Dermatology Note    Encounter Date: Sep 6, 2023    Dermatology Problem List:    ______________________________________    Impression/Plan:  Scott was seen today for derm problem.    Diagnoses and all orders for this visit:    Skin lesion of scalp  -     Adult Dermatology Referral    -Pink to violaceous plaque with central crust overlying what is likely necrosis  - Patient unable to Really describe how would like when it began but states that that there may have been some blisters which along with the circular scalloped borders could suggest a herpes virus origin, with this type of pink plaque appearance possibly a pseudo lymphomatous form  - Other possibilities include ALCL with possible regression versus some sort of exuberant arthropod bite  - Less likely considerations would be things like anthrax, no exposures       After discussion of benefits and risks including but not limited to bleeding, infection, scar, incomplete removal, recurrence, and non-diagnostic biopsy, written consent and photographs were obtained. The area was cleaned with isopropyl alcohol. 2 mL of 1% lidocaine with epinephrine was injected to obtain adequate anesthesia of the lesion on the forehead . A 4 mm punch biopsy was performed. 4-0 Prolene sutures were utilized to approximate the epidermal edges. White petrolatum ointment and a bandage was applied to the wound. Explicit verbal and written wound care instructions were provided. The patient left the dermatology clinic in good condition.    Follow-up after results .       Staff Involved:  Staff Only    Jason Araiza MD   of Dermatology  Department of Dermatology  [facility] [employer]      CC:   Chief Complaint   Patient presents with    Derm Problem     Lesion on the forehead- 3 weeks- nonhealing        History of Present Illness:  Mr. Scott Gottlieb . is a 78 year old male who presents as a new patient.    Spot  on forehead present for 3 weeks. Saw PCP 08/30. Was given zyrtec and mupirocin.  Unclear exactly how it started, ?may have been blisters, he's not sure     Labs:      Physical exam:  Vitals: There were no vitals taken for this visit.  GEN: well developed, well-nourished, in no acute distress, in a pleasant mood.     SKIN: Mancilla phototype 1  - Focused examination of the forehead was performed.  - pink plaque w/ central plaque and necrosis   - No other lesions of concern on areas examined.     Past Medical History:   Past Medical History:   Diagnosis Date    Anxiety     Coronary artery disease     Coronary artery disease     Diabetes mellitus (H)     Diabetes mellitus, type 2 (H)     Hyperlipidemia     Hypertension      Past Surgical History:   Procedure Laterality Date    CARDIAC SURGERY         Social History:   reports that he has never smoked. He has never used smokeless tobacco. He reports that he does not drink alcohol and does not use drugs.    Family History:  Family History   Problem Relation Age of Onset    Cancer No family hx of     Diabetes No family hx of     Varicose Veins Mother     Hypertension Sister     Coronary Artery Disease Brother     Hypertension Brother        Medications:  Current Outpatient Medications   Medication Sig Dispense Refill    allopurinol (ZYLOPRIM) 300 MG tablet Take 300 mg by mouth daily      atorvastatin (LIPITOR) 40 MG tablet TAKE 1 TABLET BY MOUTH EVERYDAY AT BEDTIME 90 tablet 1    cetirizine (ZYRTEC) 10 MG tablet Take 1 tablet (10 mg) by mouth daily as needed for allergies (Itching) 90 tablet 0    clopidogrel (PLAVIX) 75 MG tablet Take 75 mg by mouth daily      [employer]S HYDROCORTISONE ANTI-ITCH 0.5 % external cream APPLY TO AFFECTED AREA TWICE A DAY**OINTMENT NOT AVAILABLE      cyanocobalamin (VITAMIN B-12) 1000 MCG tablet Take 1,000 mcg by mouth daily      ergocalciferol (ERGOCALCIFEROL) 1.25 MG (24169 UT) capsule Take 50,000 Units by mouth      famotidine (PEPCID) 10 MG  tablet Take 1 tablet (10 mg) by mouth 2 times daily as needed 30 tablet 0    gabapentin (NEURONTIN) 100 MG capsule Take 100 mg by mouth 3 times daily as needed      hydrOXYzine (ATARAX) 25 MG tablet TAKE 1-2 TABLETS (25-50 MG) BY MOUTH NIGHTLY AS NEEDED FOR ANXIETY OR OTHER (SLEEP). NOT COVERED 180 tablet 1    KERENDIA 10 MG TABS       latanoprost (XALATAN) 0.005 % ophthalmic solution 1 drop      latanoprost (XALATAN) 0.005 % ophthalmic solution Apply 1 drop to eye At Bedtime      levothyroxine (SYNTHROID/LEVOTHROID) 50 MCG tablet Take 50 mcg by mouth daily      lidocaine (XYLOCAINE) 5 % external ointment Apply topically as needed for moderate pain 50 g 0    lisinopril (PRINIVIL/ZESTRIL) 5 MG tablet Take 5 mg by mouth daily      metFORMIN (GLUCOPHAGE-XR) 500 MG 24 hr tablet Take 2,000 mg by mouth daily (with breakfast)      mupirocin (BACTROBAN) 2 % external ointment Apply topically 3 times daily 30 g 1    ONETOUCH ULTRA test strip USE 1 (ONE) STRIP IN VITRO DAILY      pregabalin (LYRICA) 300 MG capsule Take 300 mg by mouth 2 times daily      timolol maleate (TIMOPTIC) 0.25 % ophthalmic solution Apply 1 drop to eye daily      timolol maleate (TIMOPTIC) 0.5 % ophthalmic solution Place 1 drop into both eyes daily      triamterene-HCTZ (DYAZIDE) 37.5-25 MG capsule Take 1 capsule by mouth every morning      triamterene-HCTZ (MAXZIDE-25) 37.5-25 MG tablet Take 1 tablet by mouth daily       Allergies   Allergen Reactions    Aspirin Hives    Compazine [Prochlorperazine] Hives    Sulfa Antibiotics Hives    Nepafenac Anxiety and Hives

## 2023-09-07 ENCOUNTER — TELEPHONE (OUTPATIENT)
Dept: DERMATOLOGY | Facility: CLINIC | Age: 78
End: 2023-09-07
Payer: MEDICARE

## 2023-09-07 NOTE — TELEPHONE ENCOUNTER
[facility] Call Center    Phone Message    May a detailed message be left on voicemail: yes     Reason for Call: Patient's daughter would like to know if patient can come in at a different time on 09/15/2023 for suture removal - she is hoping to bring him in around 9AM or around noon. Please call back [phone] Thank you    Action Taken: Other: OX DERM    Travel Screening: Not Applicable

## 2023-09-07 NOTE — TELEPHONE ENCOUNTER
S/w dtr Jennifer and offered 9:45 am or 12, 12:15, or 12:30 pm on Friday 9/15 for suture removal.  Scheduled for 12 pm on the nurse only schedule for suture removal.    Daniela GEE RN  [facility] Dermatology [place]  [phone]

## 2023-09-15 ENCOUNTER — ALLIED HEALTH/NURSE VISIT (OUTPATIENT)
Dept: DERMATOLOGY | Facility: CLINIC | Age: 78
End: 2023-09-15
Payer: MEDICARE

## 2023-09-15 DIAGNOSIS — Z48.02 VISIT FOR SUTURE REMOVAL: Primary | ICD-10-CM

## 2023-09-15 LAB
PATH REPORT.COMMENTS IMP SPEC: NORMAL
PATH REPORT.FINAL DX SPEC: NORMAL
PATH REPORT.GROSS SPEC: NORMAL
PATH REPORT.MICROSCOPIC SPEC OTHER STN: NORMAL
PATH REPORT.RELEVANT HX SPEC: NORMAL

## 2023-09-15 PROCEDURE — 99207 PR NO CHARGE NURSE ONLY: CPT

## 2023-09-15 NOTE — PROGRESS NOTES
Scott Gottlieb Sr. comes into clinic today at the request of MARIA C Sofia Ordering Provider for Suture Removal:  Incision was dry, clean and intact, incision cleansed with wound cleanser and sutures were removed. Pt tolerated the procedure.         This service provided today was under the supervising provider of the day MARIA C Sofia, who was available if needed.    Meaghan Rosenberg LPN

## 2023-10-08 ENCOUNTER — HEALTH MAINTENANCE LETTER (OUTPATIENT)
Age: 78
End: 2023-10-08

## 2023-11-21 DIAGNOSIS — L29.9 ITCHING: ICD-10-CM

## 2023-11-21 RX ORDER — CETIRIZINE HYDROCHLORIDE 10 MG/1
10 TABLET ORAL DAILY PRN
Qty: 90 TABLET | Refills: 0 | Status: SHIPPED | OUTPATIENT
Start: 2023-11-21

## 2024-01-30 DIAGNOSIS — I25.10 CORONARY ARTERY DISEASE INVOLVING NATIVE CORONARY ARTERY OF NATIVE HEART WITHOUT ANGINA PECTORIS: ICD-10-CM

## 2024-01-30 RX ORDER — ATORVASTATIN CALCIUM 40 MG/1
TABLET, FILM COATED ORAL
Qty: 90 TABLET | Refills: 1 | Status: SHIPPED | OUTPATIENT
Start: 2024-01-30

## 2024-01-30 NOTE — TELEPHONE ENCOUNTER
Medication requested: ATORVASTATIN 40 MG TABLET   Last office visit: 8/30/23  Future [facility] appointments: none  Medication last refilled: 10/27/23  Last qualifying labs: none      Routing refill request to provider for review/approval because:  Antihyperlipidemic agents [id number]/30/2024 12:05 AM   Protocol Details Lipid panel on file in past 12 mos    Normal serum ALT on record in past 12 mos       IVETT Villafuerte, BSN

## 2024-02-25 ENCOUNTER — HEALTH MAINTENANCE LETTER (OUTPATIENT)
Age: 79
End: 2024-02-25

## 2024-07-14 ENCOUNTER — HEALTH MAINTENANCE LETTER (OUTPATIENT)
Age: 79
End: 2024-07-14

## 2024-12-01 ENCOUNTER — HEALTH MAINTENANCE LETTER (OUTPATIENT)
Age: 79
End: 2024-12-01

## 2025-03-15 ENCOUNTER — HEALTH MAINTENANCE LETTER (OUTPATIENT)
Age: 80
End: 2025-03-15

## 2025-06-28 ENCOUNTER — HEALTH MAINTENANCE LETTER (OUTPATIENT)
Age: 80
End: 2025-06-28